# Patient Record
Sex: FEMALE | Race: BLACK OR AFRICAN AMERICAN | Employment: FULL TIME | URBAN - METROPOLITAN AREA
[De-identification: names, ages, dates, MRNs, and addresses within clinical notes are randomized per-mention and may not be internally consistent; named-entity substitution may affect disease eponyms.]

---

## 2017-05-04 ENCOUNTER — TELEPHONE (OUTPATIENT)
Dept: OBGYN CLINIC | Age: 42
End: 2017-05-04

## 2017-06-20 ENCOUNTER — OFFICE VISIT (OUTPATIENT)
Dept: OBGYN CLINIC | Age: 42
End: 2017-06-20

## 2017-06-20 VITALS
WEIGHT: 150.8 LBS | BODY MASS INDEX: 24.23 KG/M2 | DIASTOLIC BLOOD PRESSURE: 82 MMHG | SYSTOLIC BLOOD PRESSURE: 122 MMHG | RESPIRATION RATE: 18 BRPM | HEIGHT: 66 IN

## 2017-06-20 DIAGNOSIS — N89.8 VAGINAL DISCHARGE: ICD-10-CM

## 2017-06-20 DIAGNOSIS — N89.8 VAGINAL ODOR: Primary | ICD-10-CM

## 2017-06-20 LAB — WET MOUNT POCT, WMPOCT: NORMAL

## 2017-06-20 RX ORDER — METRONIDAZOLE 500 MG/1
500 TABLET ORAL 2 TIMES DAILY
Qty: 14 TAB | Refills: 0 | Status: SHIPPED | OUTPATIENT
Start: 2017-06-20 | End: 2017-06-27

## 2017-06-20 NOTE — PATIENT INSTRUCTIONS

## 2017-06-20 NOTE — PROGRESS NOTES
[facility]  [email or web address]  [phone]    Tommie Quintana MD, [address] [facility]       OB/GYN Problem visit    Chief Complaint:   Chief Complaint   Patient presents with    Vaginal Discharge       History of Present Illness: This is not a new problem being evaluated by this provider. The patient is a 36 y.o.  female who reports having vaginal discharge that happens after each period. Reports that, the vaginal discharge is white, thicker than normal vaginal discharge but has a strong odor. Patient expressed that she generally uses [place] soaps but had switched to Caress, because she ran out. Denies vaginal itching and/or burning, aggravating or alleviating factors. Patient does not think she has been exposed to an STD. She reports the symptoms are is unchanged. Aggravating factors include none. Alleviating factors include none. She reports no new sexual partners. She reports sx worsen before cycle and then can sometimes resolve. Has a trip planned for [facility]. She does not have other concerns. LMP: Patient's last menstrual period was 2017 (exact date).     PFSH:  Past Medical History:   Diagnosis Date    Breast mass     left    Dysplasia of cervix, low grade (CORINE 1) 3/31/10    ecto    Genital HSV     Hx of mammogram     Hx of mammogram 13    need addt'l views    Pap smear for cervical cancer screening 12    negative, HPV negative     Past Surgical History:   Procedure Laterality Date    HX BREAST BIOPSY Left 2015    LEFT BREAST EXCISIONAL BIOPSY WITH ULTRASOUND performed by Dyan Carey MD at [address] HX COLPOSCOPY  3/31/10    CIN1    HX GYN      cyst removal    HX GYN      fibroid removed: laparotomy     Family History   Problem Relation Age of Onset    No Known Problems Mother     No Known Problems Father     No Known Problems Brother     No Known Problems Brother     No Known Problems Daughter Social History   Substance Use Topics    Smoking status: Current Every Day Smoker     Types: Cigarettes    Smokeless tobacco: Never Used      Comment: Never used vapor or e-cigs     Alcohol use 0.6 oz/week     1 Standard drinks or equivalent per week     Allergies   Allergen Reactions    Diflucan [Fluconazole] Swelling     \"swollen face\"     Current Outpatient Prescriptions   Medication Sig    IBUPROFEN (MOTRIN PO) Take  by mouth.  metroNIDAZOLE (FLAGYL) 500 mg tablet Take 1 Tab by mouth two (2) times a day for 7 days.  norethindrone ac-eth estradiol (MICROGESTIN 1.5/30, 21,) 1.5-30 mg-mcg tab Take 1 Tab by mouth daily.  HYDROcodone-acetaminophen (NORCO) 7.5-325 mg per tablet Take 1 Tab by mouth every four (4) hours as needed for Pain. Max Daily Amount: 6 Tabs.  HYDROcodone-acetaminophen (NORCO) 7.5-325 mg per tablet Take 1 Tab by mouth every four (4) hours as needed for Pain. Max Daily Amount: 6 Tabs.  multivitamin (ONE A DAY) tablet Take 1 tablet by mouth daily.  BIOTIN PO Take  by mouth. No current facility-administered medications for this visit.         Review of Systems:  History obtained from the patient  Constitutional: negative for fevers, chills and weight loss  ENT ROS: negative for - hearing change, oral lesions or visual changes  Respiratory: negative for cough, wheezing or dyspnea on exertion  Cardiovascular: negative for chest pain, irregular heart beats, exertional chest pressure/discomfort  Gastrointestinal: negative for dysphagia, nausea and vomiting  Genito-Urinary ROS:  see HPI  Inteument/breast: negative for rash, breast lump and nipple discharge  Musculoskeletal:negative for stiff joints, neck pain and muscle weakness  Endocrine ROS: negative for - breast changes, galactorrhea or temperature intolerance  Hematological and Lymphatic ROS: negative for - blood clots, bruising or swollen lymph nodes    Physical Exam:  Visit Vitals    /82 (BP 1 Location: Left arm, BP Patient Position: Sitting)    Resp 18    Ht 5' 6\" (1.676 m)    Wt 150 lb 12.8 oz (68.4 kg)    BMI 24.34 kg/m2       GENERAL: alert, well appearing, and in no distress  HEAD: normocephalic, atraumatic. PULM: clear to auscultation, no wheezes, rales or rhonchi, symmetric air entry   COR: normal rate and regular rhythm, S1 and S2 normal   ABDOMEN: soft, nontender, nondistended, no masses or organomegaly   EGBUS: no lesions, no inflammation, no masses  VULVA: normal appearing vulva with no masses, tenderness or lesions  VAGINA: normal appearing vagina with normal color, no lesions, white and thin discharge  CERVIX: normal appearing cervix without discharge or lesions, non tender  UTERUS: uterus is normal size, shape, consistency and nontender   ADNEXA: normal adnexa in size, nontender and no masses  NEURO: alert, oriented, normal speech    Assessment:  Encounter Diagnoses   Name Primary?  Vaginal odor Yes    Vaginal discharge        Plan:  The patient is advised that she should contact the office if she does not note improvement or if symptoms recur  Recommend follow up with PCP for non-gynecologic complaints and chronic medical problems. She should contact our office with any questions or concerns  She could keep her routine annual exam appointment.    Disc good vulvar hygiene    Rec ae/pap/mmg: over due    Flagyl: avoid etoh w flagyl, can hold and await nuswab since wet prep not clearly diagnostic        Orders Placed This Encounter    NUSWAB VAGINITIS    AMB POC WET PREP (AKA STAIN, INTERPRET, WET MOUNT)    metroNIDAZOLE (FLAGYL) 500 mg tablet       Results for orders placed or performed in visit on 06/20/17   AMB POC SMEAR, STAIN & INTERPRET, WET MOUNT   Result Value Ref Range    Wet mount (POC)      Narrative    JACINTO    Hypae: negative  Buds: negative    Wet Prep:  Trich: negative  Clue cells: negative  Hyphae: negative  Buds: negative  WBC's: normal

## 2017-06-20 NOTE — MR AVS SNAPSHOT
Visit Information Date & Time Provider Department Dept. Phone Encounter #  
 6/20/2017  1:50 PM Akira Birmingham MD [facility] [phone] [id number] Upcoming Health Maintenance Date Due  
 PAP AKA CERVICAL CYTOLOGY 6/3/1995 INFLUENZA AGE 9 TO ADULT 8/1/2017 Allergies as of 6/20/2017  Review Complete On: 6/20/2017 By: Annie Moreland Severity Noted Reaction Type Reactions Diflucan [Fluconazole]  03/16/2015    Swelling \"swollen face\" Current Immunizations  Never Reviewed No immunizations on file. Not reviewed this visit Vitals BP Resp Height(growth percentile) Weight(growth percentile) LMP BMI  
 122/82 (BP 1 Location: Left arm, BP Patient Position: Sitting) 18 5' 6\" (1.676 m) 150 lb 12.8 oz (68.4 kg) 06/11/2017 (Exact Date) 24.34 kg/m2 OB Status Smoking Status Having regular periods Current Every Day Smoker BMI and BSA Data Body Mass Index Body Surface Area  
 24.34 kg/m 2 1.78 m 2 Preferred Pharmacy Pharmacy Name Phone [facility] [place], [address] [place] [facility] [phone] Your Updated Medication List  
  
   
This list is accurate as of: 6/20/17  2:14 PM.  Always use your most recent med list.  
  
  
  
  
 BIOTIN PO Take  by mouth.  
  
 * HYDROcodone-acetaminophen 7.5-325 mg per tablet Commonly known as:  Ceil Heap Take 1 Tab by mouth every four (4) hours as needed for Pain. Max Daily Amount: 6 Tabs. * HYDROcodone-acetaminophen 7.5-325 mg per tablet Commonly known as:  Ceil Heap Take 1 Tab by mouth every four (4) hours as needed for Pain. Max Daily Amount: 6 Tabs. MOTRIN PO Take  by mouth.  
  
 multivitamin tablet Commonly known as:  ONE A DAY Take 1 tablet by mouth daily. norethindrone ac-eth estradiol 1.5-30 mg-mcg Tab Commonly known as:  Martin Laws 1.5/30 (21) Take 1 Tab by mouth daily. * Notice: This list has 2 medication(s) that are the same as other medications prescribed for you. Read the directions carefully, and ask your doctor or other care provider to review them with you. Patient Instructions Vaginitis: Care Instructions Your Care Instructions Vaginitis is soreness or infection of the vagina. This common problem can cause itching and burning. And it can cause a change in vaginal discharge. Sometimes it can cause pain during sex. Vaginitis may be caused by bacteria, yeast, or other germs. Some infections that cause it are caught from a sexual partner. Bath products, spermicides, and douches can irritate the vagina too. Some women have this problem during and after menopause. A drop in estrogen levels during this time can cause dryness, soreness, and pain during sex. Your doctor can give you medicine to treat an infection. And home care may help you feel better. For certain types of infections, your sex partner must be treated too. Follow-up care is a key part of your treatment and safety. Be sure to make and go to all appointments, and call your doctor if you are having problems. It's also a good idea to know your test results and keep a list of the medicines you take. How can you care for yourself at home? · If your doctor prescribed antibiotics, take them as directed. Do not stop taking them just because you feel better. You need to take the full course of antibiotics. · Take your medicines exactly as prescribed. Call your doctor if you think you are having a problem with your medicine. · Do not eat or drink anything that has alcohol if you are taking metronidazole (Flagyl). · If you have a yeast infection, use over-the-counter products as your doctor tells you to. Or take medicine your doctor prescribes exactly as directed. · Wash your vaginal area daily with water. You also can use a mild, unscented soap if you want. · Do not use scented bath products. And do not use vaginal sprays or douches. · Put a washcloth soaked in cool water on the area to relieve itching. Or you can take cool baths. · If you have dryness because of menopause, use estrogen cream or pills that your doctor prescribes. · Ask your doctor about when it is okay to have sex. · Use a personal lubricant before sex if you have dryness. Examples are Astroglide, K-Y Jelly, and Wet Lubricant Gel. · Ask your doctor if your sex partner also needs treatment. When should you call for help? Call your doctor now or seek immediate medical care if: 
· You have a fever and pelvic pain. Watch closely for changes in your health, and be sure to contact your doctor if: 
· You have bleeding other than your period. · You do not get better as expected. Where can you learn more? Go to http://buck-nate.info/. Enter I884 in the search box to learn more about \"Vaginitis: Care Instructions. \" Current as of: October 13, 2016 Content Version: 11.3 © 2122-1694 [employer]. Care instructions adapted under license by [employer] (which disclaims liability or warranty for this information). If you have questions about a medical condition or this instruction, always ask your healthcare professional. [address] any warranty or liability for your use of this information. Introducing [facility]! Kaleb Jacinto introduces wali patient portal. Now you can access parts of your medical record, email your doctor's office, and request medication refills online. 1. In your internet browser, go to [email or web address]. [email or web address] 2. Click on the First Time User? Click Here link in the Sign In box. You will see the New Member Sign Up page. 3. Enter your wali Access Code exactly as it appears below. You will not need to use this code after youve completed the sign-up process.  If you do not sign up before the expiration date, you must request a new code. · [employer]han[employer] Access Code: [email or web address] Expires: 9/18/2017  1:53 PM 
 
4. Enter the last four digits of your Social Security Number (xxxx) and Date of Birth (mm/dd/yyyy) as indicated and click Submit. You will be taken to the next sign-up page. 5. Create a EnhanCV ID. This will be your [employer] login ID and cannot be changed, so think of one that is secure and easy to remember. 6. Create a En[employer] password. You can change your password at any time. 7. Enter your Password Reset Question and Answer. This can be used at a later time if you forget your password. 8. Enter your e-mail address. You will receive e-mail notification when new information is available in [address] 9. Click Sign Up. You can now view and download portions of your medical record. 10. Click the Download Summary menu link to download a portable copy of your medical information. If you have questions, please visit the Frequently Asked Questions section of the [employer] website. Remember, [employer] is NOT to be used for urgent needs. For medical emergencies, dial 911. Now available from your iPhone and Android! Please provide this summary of care documentation to your next provider. Your primary care clinician is listed as NONE. If you have any questions after today's visit, please call [phone].

## 2017-06-23 LAB
A VAGINAE DNA VAG QL NAA+PROBE: ABNORMAL SCORE
BVAB2 DNA VAG QL NAA+PROBE: ABNORMAL SCORE
C ALBICANS DNA VAG QL NAA+PROBE: NEGATIVE
C GLABRATA DNA VAG QL NAA+PROBE: NEGATIVE
MEGA1 DNA VAG QL NAA+PROBE: ABNORMAL SCORE
T VAGINALIS RRNA SPEC QL NAA+PROBE: NEGATIVE

## 2017-06-29 ENCOUNTER — TELEPHONE (OUTPATIENT)
Dept: OBGYN CLINIC | Age: 42
End: 2017-06-29

## 2017-06-29 NOTE — TELEPHONE ENCOUNTER
Patient notified of results and MD recommendations. Patient verbalized understanding.     ----- Message from Anabell Lockett MD sent at 6/24/2017  7:37 PM EDT -----  Abnormal results. Please notify pt. C/w BV, rx sent at visit.    Fu if NI

## 2017-11-07 ENCOUNTER — TELEPHONE (OUTPATIENT)
Dept: OBGYN CLINIC | Age: 42
End: 2017-11-07

## 2017-11-07 RX ORDER — VALACYCLOVIR HYDROCHLORIDE 500 MG/1
TABLET, FILM COATED ORAL
Qty: 30 TAB | Refills: 0 | Status: SHIPPED | OUTPATIENT
Start: 2017-11-07 | End: 2017-11-16 | Stop reason: SDUPTHER

## 2017-11-07 NOTE — TELEPHONE ENCOUNTER
Call received at 8:41AM      TP 36year old patient last seen for AE on 11/23/15. Patient calling to request a refill on her Valtrex for outbreaks. This nurse advised patient of need to make appointment to be seen for AE and mammogram.  Patient placed on the schedule to be seen on 12/14/17 for mammogram at 9:20am and AE at 9:40am.    Patient advised to keep appointment to be able to get further refills. ?0k to refill Valtrex 500 mg take one tab by mouth 2 times a day for 3 days for 30 day supply to local pharmacy.      Please advise

## 2017-11-16 ENCOUNTER — OFFICE VISIT (OUTPATIENT)
Dept: OBGYN CLINIC | Age: 42
End: 2017-11-16

## 2017-11-16 VITALS
SYSTOLIC BLOOD PRESSURE: 120 MMHG | WEIGHT: 155 LBS | BODY MASS INDEX: 24.91 KG/M2 | DIASTOLIC BLOOD PRESSURE: 80 MMHG | HEIGHT: 66 IN

## 2017-11-16 DIAGNOSIS — N89.8 VAGINAL DISCHARGE: ICD-10-CM

## 2017-11-16 DIAGNOSIS — A60.00 GENITAL HERPES SIMPLEX, UNSPECIFIED SITE: ICD-10-CM

## 2017-11-16 DIAGNOSIS — Z01.411 ENCOUNTER FOR GYNECOLOGICAL EXAMINATION (GENERAL) (ROUTINE) WITH ABNORMAL FINDINGS: Primary | ICD-10-CM

## 2017-11-16 DIAGNOSIS — N92.0 MENORRHAGIA WITH REGULAR CYCLE: ICD-10-CM

## 2017-11-16 DIAGNOSIS — Z72.0 TOBACCO USE: ICD-10-CM

## 2017-11-16 DIAGNOSIS — N89.8 VAGINAL ODOR: ICD-10-CM

## 2017-11-16 DIAGNOSIS — Z11.51 SCREENING FOR HPV (HUMAN PAPILLOMAVIRUS): ICD-10-CM

## 2017-11-16 DIAGNOSIS — N60.92 ATYPICAL HYPERPLASIA OF LEFT BREAST: ICD-10-CM

## 2017-11-16 DIAGNOSIS — R45.86 MOOD CHANGE: ICD-10-CM

## 2017-11-16 LAB — WET MOUNT POCT, WMPOCT: NORMAL

## 2017-11-16 RX ORDER — VALACYCLOVIR HYDROCHLORIDE 500 MG/1
TABLET, FILM COATED ORAL
Qty: 30 TAB | Refills: 5 | Status: SHIPPED | OUTPATIENT
Start: 2017-11-16 | End: 2017-11-16 | Stop reason: CLARIF

## 2017-11-16 RX ORDER — VALACYCLOVIR HYDROCHLORIDE 500 MG/1
500 TABLET, FILM COATED ORAL 2 TIMES DAILY
Qty: 180 TAB | Refills: 0 | Status: SHIPPED | OUTPATIENT
Start: 2017-11-16 | End: 2018-02-14

## 2017-11-16 NOTE — PATIENT INSTRUCTIONS

## 2017-11-16 NOTE — PROGRESS NOTES
[facility]  [email or web address]  [phone]    Lul Quintana MD, [address] [facility]       Annual Gynecologic Exam  WWE 40-60  Chief Complaint   Patient presents with    Well Woman       Jose Carlos Granda is a 36 y.o.  [address]  female who presents for an annual exam.  Patient's last menstrual period was 10/28/2017. Candance Huger She does report additional concerns today. Patient reports a clear vaginal discharge and odor that started this morning. Patient also states that she has become more emotional before her period begins. H/o myomas, has some cramping with cycles. Menstrual status:  Her periods are heavy for the first 2 days then is spotting. She does report dysmenorrhea/painful menses. She does not report irregular bleeding. Sexual history and Contraception:  History   Sexual Activity    Sexual activity: Yes    Partners: Male    Birth control/ protection: None     She never uses condoms with sexual activity. She does not reports new sexual partner(s) in the last year. The patient does not request STD testing. Preventive Medicine History:  Her last annual GYN exam was about two years ago. Her most recent Pap smear result: normal was obtained in 2012. Her most recent HR HPV screen was Negative obtained 5 year(s) ago. She does not have a history of CORINE 2, 3 or cervical cancer. Breast health:  Last mammogram: approximate date 2/6/15 and was abnormal: Showed atypical cells. A mammogram was not scheduled for today. Patient states that she has an appointment here 17 for a mammogram.  Breast cancer family updated: see . Bone health: Candance Huger She does not have a history of osteopenia/osteoporosis. Osteoporosis family history updated: see .      Past Medical History:   Diagnosis Date    Breast mass     left    Dysplasia of cervix, low grade (CORINE 1) 3/31/10    ecto    Genital HSV     Hx of mammogram     Hx of mammogram 14    need addt'l views    Pap smear for cervical cancer screening 12    negative, HPV negative     OB History    Para Term  AB Living   2 1 1 0 1 1   SAB TAB Ectopic Molar Multiple Live Births   0 1 0  0       # Outcome Date GA Lbr Mike/2nd Weight Sex Delivery Anes PTL Lv   2 TAB            1 Term               Obstetric Comments   Menarche:  14. LMP: 2/23/15. # of Children:  1. Age at Delivery of First Child:  24.   Hysterectomy/oophorectomy:  NO/NO. Breast Bx:  no.  Hx of Breast Feeding:  no. BCP:  no. Hormone therapy:  no.        Past Surgical History:   Procedure Laterality Date    HX BREAST BIOPSY Left 2015    LEFT BREAST EXCISIONAL BIOPSY WITH ULTRASOUND performed by Diivna Madrid MD at [address] HX COLPOSCOPY  3/31/10    CIN1    HX GYN      cyst removal    HX GYN      fibroid removed: laparotomy     Family History   Problem Relation Age of Onset    No Known Problems Mother     No Known Problems Father     No Known Problems Brother     No Known Problems Brother     No Known Problems Daughter      Social History     Social History    Marital status:      Spouse name: N/A    Number of children: N/A    Years of education: N/A     Occupational History    Not on file. Social History Main Topics    Smoking status: Current Every Day Smoker     Types: Cigarettes    Smokeless tobacco: Never Used      Comment: Never used vapor or e-cigs     Alcohol use 0.6 oz/week     1 Standard drinks or equivalent per week    Drug use: No    Sexual activity: Yes     Partners: Male     Birth control/ protection: None     Other Topics Concern    Not on file     Social History Narrative       Allergies   Allergen Reactions    Diflucan [Fluconazole] Swelling     \"swollen face\"       Current Outpatient Prescriptions   Medication Sig    valACYclovir (VALTREX) 500 mg tablet Take 1 Tab by mouth two (2) times a day for 90 days.     IBUPROFEN (MOTRIN PO) Take  by mouth.  norethindrone ac-eth estradiol (MICROGESTIN 1.5/30, 21,) 1.5-30 mg-mcg tab Take 1 Tab by mouth daily.  HYDROcodone-acetaminophen (NORCO) 7.5-325 mg per tablet Take 1 Tab by mouth every four (4) hours as needed for Pain. Max Daily Amount: 6 Tabs.  HYDROcodone-acetaminophen (NORCO) 7.5-325 mg per tablet Take 1 Tab by mouth every four (4) hours as needed for Pain. Max Daily Amount: 6 Tabs.  multivitamin (ONE A DAY) tablet Take 1 tablet by mouth daily.  BIOTIN PO Take  by mouth. No current facility-administered medications for this visit.         Patient Active Problem List   Diagnosis Code    Depression F32.9    Major depressive disorder, recurrent episode, severe, without mention of psychotic behavior F33.2    Borderline personality disorder F60.3    Atypical hyperplasia of left breast N62       Review of Systems - History obtained from the patient  Constitutional: negative for weight loss, fever, night sweats  HEENT: negative for hearing loss, earache, congestion, snoring, sorethroat  CV: negative for chest pain, palpitations, edema  Resp: negative for cough, shortness of breath, wheezing  GI: negative for change in bowel habits, abdominal pain, black or bloody stools  : negative for frequency, dysuria, hematuria, vaginal discharge  MSK: negative for back pain, joint pain, muscle pain  Breast: negative for breast lumps, nipple discharge, galactorrhea  Skin :negative for itching, rash, hives  Neuro: negative for dizziness, headache, confusion, weakness  Psych: negative for anxiety, depression, change in mood  Heme/lymph: negative for bleeding, bruising, pallor    Physical Exam  Visit Vitals    /80    Ht 5' 6\" (1.676 m)    Wt 155 lb (70.3 kg)    LMP 10/28/2017    BMI 25.02 kg/m2       Constitutional  · Appearance: well-nourished, well developed, alert, in no acute distress    HENT  · Head and Face: appears normal    Neck  · Inspection/Palpation: normal appearance, no masses or tenderness  · Lymph Nodes: no lymphadenopathy present  · Thyroid: gland size normal, nontender, no nodules or masses present on palpation    Chest  · Respiratory Effort: breathing unlabored  · Auscultation: normal breath sounds    Cardiovascular  · Heart:  · Auscultation: regular rate and rhythm without murmur    Breasts  · Inspection of Breasts: breasts symmetrical, no skin changes, no discharge present, nipple appearance normal, no skin retraction present  · Palpation of Breasts and Axillae: no masses present on palpation, no breast tenderness  · Axillary Lymph Nodes: no lymphadenopathy present    Gastrointestinal  · Abdominal Examination: abdomen non-tender to palpation, normal bowel sounds, no masses present  · Liver and spleen: no hepatomegaly present, spleen not palpable  · Hernias: no hernias identified    Genitourinary  · External Genitalia: normal appearance for age, no discharge present, no tenderness present, no inflammatory lesions present, no masses present, no atrophy present  · Vagina: normal vaginal vault without central or paravaginal defects, thin white discharge present, no inflammatory lesions present, no masses present  · Bladder: non-tender to palpation  · Urethra: appears normal  · Cervix: normal   · Uterus: normal size, shape and consistency  · Adnexa: no adnexal tenderness present, no adnexal masses present  · Perineum: perineum within normal limits, no evidence of trauma, no rashes or skin lesions present  · Anus: anus within normal limits, no hemorrhoids present  · Inguinal Lymph Nodes: no lymphadenopathy present    Skin  · General Inspection: no rash, no lesions identified    Neurologic/Psychiatric  · Mental Status:  · Orientation: grossly oriented to person, place and time  · Mood and Affect: mood normal, affect appropriate    Assessment:  36 y.o.  for well woman exam  Encounter Diagnoses   Name Primary?     Atypical hyperplasia of left breast     Encounter for gynecological examination (general) (routine) with abnormal findings Yes    Genital herpes simplex, unspecified site     Screening for HPV (human papillomavirus)     Vaginal discharge     Vaginal odor     Mood change ([address]     Tobacco use     Menorrhagia with regular cycle        Plan:  The patient was counseled about diet, exercise, healthy lifestyle  We discussed current self breast exam and mammogram recommendations  We discussed current pap smear and HR HPV testing guidelines  We recommend follow up in one year for routine annual gynecologic exam or sooner if needed  We recommend follow up with a primary care physician for any chronic medical problems or non-gynecologic concerns    We discussed calcium/vitamin D/weight bearing exercise and osteoporosis prevention  Handouts were given to the patient    rec tobacco cessation: h/o given  We discussed potential causes of vaginal discharge/irritation. We discussed good vulvar hygiene. Recommended avoid vaginal irritants. Discussed use of mild soaps/detergents. Follow up if NI. We we reviewed wet prep findings with the patient at her visit. Patient aware she will be notified about swab results and prescription sent, if indicated.      FU and US, check myoma, disc options for heavy bleeding  Disc options for PMS sx: pt will observe and try healthy diet regular exercise  Disc rba of hormonal management, pt defers     Folllow up:  [x] return for annual well woman exam in one year or sooner if she is having problems  [] follow up and ultrasound  [x] mammogram  [] 6 months  [] 3 months  [] 1 month    Orders Placed This Encounter    NUSWAB VAGINITIS    AMB POC WET PREP (AKA STAIN, INTERPRET, WET MOUNT)    DISCONTD: valACYclovir (VALTREX) 500 mg tablet    valACYclovir (VALTREX) 500 mg tablet    PAP IG, HPV AND RFX HPV 46/08,52([id number])       Results for orders placed or performed in visit on 11/16/17   AMB POC SMEAR, STAIN & INTERPRET, WET MOUNT   Result Value Ref Range    Wet mount (POC)      Narrative    JACINTO    Hypae: negative  Buds: negative    Wet Prep:  Trich: negative  Clue cells: negative  Hyphae: negative  Buds: negative  WBC's: normal

## 2017-11-16 NOTE — MR AVS SNAPSHOT
Visit Information Date & Time Provider Department Dept. Phone Encounter #  
 11/16/2017  9:00 AM MD Papito Hassan [phone] [id number] Follow-up Instructions Return in about 1 year (around 11/16/2018), or if symptoms worsen or fail to improve, for Annual exam.  
  
Your Appointments 12/14/2017  9:20 AM  
MAMMOGRAPHY with MAMMOGRAM, JORGE Machuca ([facility]) Appt Note: mammogram, then ae  
 [address] [facility] [address] Critical access hospital [phone]  
[facility] 31 1233 [address] Upcoming Health Maintenance Date Due  
 PAP AKA CERVICAL CYTOLOGY 6/3/1995 Influenza Age 5 to Adult 8/1/2017 Allergies as of 11/16/2017  Review Complete On: 11/16/2017 By: Diane Khan LPN Severity Noted Reaction Type Reactions Diflucan [Fluconazole]  03/16/2015    Swelling \"swollen face\" Current Immunizations  Never Reviewed No immunizations on file. Not reviewed this visit You Were Diagnosed With   
  
 Codes Comments Encounter for gynecological examination (general) (routine) with abnormal findings    -  Primary ICD-10-CM: T73.490 ICD-9-CM: V72.31 Atypical hyperplasia of left breast     ICD-10-CM: N62 
ICD-9-CM: 611.1 Genital herpes simplex, unspecified site     ICD-10-CM: A60.00 ICD-9-CM: 054.10 Vitals BP Height(growth percentile) Weight(growth percentile) LMP BMI OB Status 120/80 5' 6\" (1.676 m) 155 lb (70.3 kg) 10/28/2017 25.02 kg/m2 Having regular periods Smoking Status Current Every Day Smoker BMI and BSA Data Body Mass Index Body Surface Area 25.02 kg/m 2 1.81 m 2 Preferred Pharmacy Pharmacy Name Phone [facility] [address] [place] [phone] [facility] [phone] Your Updated Medication List  
  
   
 This list is accurate as of: 11/16/17  9:28 AM.  Always use your most recent med list.  
  
  
  
  
 BIOTIN PO Take  by mouth.  
  
 * HYDROcodone-acetaminophen 7.5-325 mg per tablet Commonly known as:  Lilliana Jesu Take 1 Tab by mouth every four (4) hours as needed for Pain. Max Daily Amount: 6 Tabs. * HYDROcodone-acetaminophen 7.5-325 mg per tablet Commonly known as:  Lilliana Jesu Take 1 Tab by mouth every four (4) hours as needed for Pain. Max Daily Amount: 6 Tabs. MOTRIN PO Take  by mouth.  
  
 multivitamin tablet Commonly known as:  ONE A DAY Take 1 tablet by mouth daily. norethindrone ac-eth estradiol 1.5-30 mg-mcg Tab Commonly known as:  Etta [place]atte 1.5/30 (21) Take 1 Tab by mouth daily. valACYclovir 500 mg tablet Commonly known as:  VALTREX Take on tab by mouth 2 times a day for 3 days. * Notice: This list has 2 medication(s) that are the same as other medications prescribed for you. Read the directions carefully, and ask your doctor or other care provider to review them with you. Prescriptions Sent to Pharmacy Refills  
 valACYclovir (VALTREX) 500 mg tablet 5 Sig: Take on tab by mouth 2 times a day for 3 days. Class: Normal  
 Pharmacy: [facility] [address] [place] [address] AT 1500 [facility] #: [phone] Follow-up Instructions Return in about 1 year (around 11/16/2018), or if symptoms worsen or fail to improve, for Annual exam.  
  
  
Patient Instructions Well Visit, Ages 25 to 48: Care Instructions Your Care Instructions Physical exams can help you stay healthy. Your doctor has checked your overall health and may have suggested ways to take good care of yourself. He or she also may have recommended tests. At home, you can help prevent illness with healthy eating, regular exercise, and other steps. Follow-up care is a key part of your treatment and safety. Be sure to make and go to all appointments, and call your doctor if you are having problems. It's also a good idea to know your test results and keep a list of the medicines you take. How can you care for yourself at home? · Reach and stay at a healthy weight. This will lower your risk for many problems, such as obesity, diabetes, heart disease, and high blood pressure. · Get at least 30 minutes of physical activity on most days of the week. Walking is a good choice. You also may want to do other activities, such as running, swimming, cycling, or playing tennis or team sports. Discuss any changes in your exercise program with your doctor. · Do not smoke or allow others to smoke around you. If you need help quitting, talk to your doctor about stop-smoking programs and medicines. These can increase your chances of quitting for good. · Talk to your doctor about whether you have any risk factors for sexually transmitted infections (STIs). Having one sex partner (who does not have STIs and does not have sex with anyone else) is a good way to avoid these infections. · Use birth control if you do not want to have children at this time. Talk with your doctor about the choices available and what might be best for you. · Protect your skin from too much sun. When you're outdoors from 10 a.m. to 4 p.m., stay in the shade or cover up with clothing and a hat with a wide brim. Wear sunglasses that block UV rays. Even when it's cloudy, put broad-spectrum sunscreen (SPF 30 or higher) on any exposed skin. · See a dentist one or two times a year for checkups and to have your teeth cleaned. · Wear a seat belt in the car. · Drink alcohol in moderation, if at all. That means no more than 2 drinks a day for men and 1 drink a day for women. Follow your doctor's advice about when to have certain tests. These tests can spot problems early. For everyone · Cholesterol. Have the fat (cholesterol) in your blood tested after age 21. Your doctor will tell you how often to have this done based on your age, family history, or other things that can increase your risk for heart disease. · Blood pressure. Have your blood pressure checked during a routine doctor visit. Your doctor will tell you how often to check your blood pressure based on your age, your blood pressure results, and other factors. · Vision. Talk with your doctor about how often to have a glaucoma test. 
· Diabetes. Ask your doctor whether you should have tests for diabetes. · Colon cancer. Have a test for colon cancer at age 48. You may have one of several tests. If you are younger than 48, you may need a test earlier if you have any risk factors. Risk factors include whether you already had a precancerous polyp removed from your colon or whether your parent, brother, sister, or child has had colon cancer. For women · Breast exam and mammogram. Talk to your doctor about when you should have a clinical breast exam and a mammogram. Medical experts differ on whether and how often women under 50 should have these tests. Your doctor can help you decide what is right for you. · Pap test and pelvic exam. Begin Pap tests at age 24. A Pap test is the best way to find cervical cancer. The test often is part of a pelvic exam. Ask how often to have this test. 
· Tests for sexually transmitted infections (STIs). Ask whether you should have tests for STIs. You may be at risk if you have sex with more than one person, especially if your partners do not wear condoms. For men · Tests for sexually transmitted infections (STIs). Ask whether you should have tests for STIs. You may be at risk if you have sex with more than one person, especially if you do not wear a condom. · Testicular cancer exam. Ask your doctor whether you should check your testicles regularly. · Prostate exam. Talk to your doctor about whether you should have a blood test (called a PSA test) for prostate cancer. Experts differ on whether and when men should have this test. Some experts suggest it if you are older than 39 and are -American or have a father or brother who got prostate cancer when he was younger than 72. When should you call for help? Watch closely for changes in your health, and be sure to contact your doctor if you have any problems or symptoms that concern you. Where can you learn more? Go to http://buck-nate.info/. Enter P072 in the search box to learn more about \"Well Visit, Ages 25 to 48: Care Instructions. \" Current as of: May 12, 2017 Content Version: 11.4 © 2954-7097 [employer]. Care instructions adapted under license by [employer] (which disclaims liability or warranty for this information). If you have questions about a medical condition or this instruction, always ask your healthcare professional. [place] [address] any warranty or liability for your use of this information. Introducing [facility]! Nik Amaya introduces Casper patient portal. Now you can access parts of your medical record, email your doctor's office, and request medication refills online. 1. In your internet browser, go to [email or web address] 2. Click on the First Time User? Click Here link in the Sign In box. You will see the New Member Sign Up page. 3. Enter your Casper Access Code exactly as it appears below. You will not need to use this code after youve completed the sign-up process. If you do not sign up before the expiration date, you must request a new code. · Casper Access Code: [email or web address] Expires: 2/14/2018  9:28 AM 
 
4. Enter the last four digits of your Social Security Number (xxxx) and Date of Birth (mm/dd/yyyy) as indicated and click Submit.  You will be taken to the next sign-up page. 5. Create a Glomera ID. This will be your Glomera login ID and cannot be changed, so think of one that is secure and easy to remember. 6. Create a Glomera password. You can change your password at any time. 7. Enter your Password Reset Question and Answer. This can be used at a later time if you forget your password. 8. Enter your e-mail address. You will receive e-mail notification when new information is available in [address]. 9. Click Sign Up. You can now view and download portions of your medical record. 10. Click the Download Summary menu link to download a portable copy of your medical information. If you have questions, please visit the Frequently Asked Questions section of the Glomera website. Remember, Glomera is NOT to be used for urgent needs. For medical emergencies, dial 911. Now available from your iPhone and Android! Please provide this summary of care documentation to your next provider. Your primary care clinician is listed as NONE. If you have any questions after today's visit, please call [phone].

## 2017-11-20 LAB
A VAGINAE DNA VAG QL NAA+PROBE: ABNORMAL SCORE
BVAB2 DNA VAG QL NAA+PROBE: ABNORMAL SCORE
C ALBICANS DNA VAG QL NAA+PROBE: NEGATIVE
C GLABRATA DNA VAG QL NAA+PROBE: NEGATIVE
CYTOLOGIST CVX/VAG CYTO: NORMAL
CYTOLOGY CVX/VAG DOC THIN PREP: NORMAL
CYTOLOGY HISTORY:: NORMAL
DX ICD CODE: NORMAL
HPV I/H RISK 1 DNA CVX QL PROBE+SIG AMP: NEGATIVE
Lab: NORMAL
MEGA1 DNA VAG QL NAA+PROBE: ABNORMAL SCORE
OTHER STN SPEC: NORMAL
PATH REPORT.FINAL DX SPEC: NORMAL
STAT OF ADQ CVX/VAG CYTO-IMP: NORMAL
T VAGINALIS RRNA SPEC QL NAA+PROBE: NEGATIVE

## 2017-11-20 RX ORDER — METRONIDAZOLE 500 MG/1
500 TABLET ORAL 2 TIMES DAILY
Qty: 14 TAB | Refills: 0 | Status: SHIPPED | OUTPATIENT
Start: 2017-11-20 | End: 2017-11-27

## 2017-11-20 NOTE — PROGRESS NOTES
Pap wnl.  (flagyl rx sent)  Abnormal, notify patient.   Consistent with BV   Rx:   flagyl 500mg bid   x 7 days    May cause nausea, take with food  Avoid etoh during treatment and 1 day following  Notify MD if NI after 1 week    (If patient prefers vaginal tx't  0.75 %t metronidazole vaginal gel   5 grams qhs per vagina  x5 days)

## 2017-11-21 ENCOUNTER — TELEPHONE (OUTPATIENT)
Dept: OBGYN CLINIC | Age: 42
End: 2017-11-21

## 2017-11-21 NOTE — TELEPHONE ENCOUNTER
LMTCB    ----- Message from Alfonzo Cohen MD sent at 11/20/2017  9:27 AM EST -----  Pap wnl.  (flagyl rx sent)  Abnormal, notify patient.   Consistent with BV   Rx:   flagyl 500mg bid   x 7 days    May cause nausea, take with food  Avoid etoh during treatment and 1 day following  Notify MD if NI after 1 week    (If patient prefers vaginal tx't  0.75 %t metronidazole vaginal gel   5 grams qhs per vagina  x5 days)

## 2017-11-22 ENCOUNTER — TELEPHONE (OUTPATIENT)
Dept: OBGYN CLINIC | Age: 42
End: 2017-11-22

## 2017-11-22 RX ORDER — METRONIDAZOLE 7.5 MG/G
1 GEL VAGINAL
Qty: 187.5 MG | Refills: 0 | Status: SHIPPED | OUTPATIENT
Start: 2017-11-22 | End: 2017-11-27

## 2017-11-22 NOTE — TELEPHONE ENCOUNTER
Patient was calling to report that Dr. Cheikh Frances nurse called in Flagyl for her and she is in the midst of moving and does not want to take oral medication. Dr. Britt Leong did offer in note that we could send in 0.75 Metronidazole vaginal gel instead to insert qhs x 5 days and patient wants that option. Rx forwarded to Linh at [facility] as directed.

## 2018-03-02 ENCOUNTER — TELEPHONE (OUTPATIENT)
Dept: OBGYN CLINIC | Age: 43
End: 2018-03-02

## 2018-03-02 NOTE — TELEPHONE ENCOUNTER
Patient aware of MD recommendations. Patient opted to want to schedule the US and follow up but did not know her schedule. She would like to contact us on Monday to schedule that appt.

## 2018-03-02 NOTE — TELEPHONE ENCOUNTER
Patient calling stating that she has been having prolonged bleeding which this is the first time this has happened. Patient reports that her period was due 02/16/2018 but did not start until 02/22/2018 and now reports that she is changing her regular tampon every 6 hours and has more cramping than what she used to have. She has not taking any home UPT's and the bleeding is bright red. Please advise.

## 2018-03-02 NOTE — TELEPHONE ENCOUNTER
May be related to fibroids or hormonal fluctuation. Looks like she is due for fu and US; rec that fu. (when there is an opening)    She can also try ibuprofen per protocol if not pregnant  Rec UPT if indicated.      Bleeding precautions: if dizzy/weak; rec problem appt before US, or to ER if sx severe

## 2018-12-06 ENCOUNTER — TELEPHONE (OUTPATIENT)
Dept: OBGYN CLINIC | Age: 43
End: 2018-12-06

## 2018-12-06 RX ORDER — VALACYCLOVIR HYDROCHLORIDE 500 MG/1
TABLET, FILM COATED ORAL
Qty: 30 TAB | Refills: 5 | Status: SHIPPED | OUTPATIENT
Start: 2018-12-06 | End: 2020-01-28

## 2018-12-06 NOTE — TELEPHONE ENCOUNTER
Message left at 500am    39year old patient last seen in the office on 11/16/17. Patient left a message requesting a refill on her Valtrex 500 mg that she takes for outbreaks. This nurse called the patient and advised of need to schedule AE. Patient placed on the schedule for AE and mammogram on 1/15/18 at 10:00am and then 10:20am    ? [phone] [address] for the is nurse to send a month supply of Valtrex  to get patient to her AE.        Please advise

## 2018-12-06 NOTE — TELEPHONE ENCOUNTER
Prescription sent as per MD order to patient preferred pharmacy. This nurse left a detailed message for the patient regarding MD recommendations and prescription sent as per MD order to her patient preferred pharmacy.

## 2019-01-15 ENCOUNTER — OFFICE VISIT (OUTPATIENT)
Dept: OBGYN CLINIC | Age: 44
End: 2019-01-15

## 2019-01-15 VITALS
SYSTOLIC BLOOD PRESSURE: 120 MMHG | WEIGHT: 154.4 LBS | BODY MASS INDEX: 24.81 KG/M2 | DIASTOLIC BLOOD PRESSURE: 74 MMHG | HEIGHT: 66 IN

## 2019-01-15 DIAGNOSIS — N39.3 STRESS INCONTINENCE: ICD-10-CM

## 2019-01-15 DIAGNOSIS — R35.0 URINARY FREQUENCY: ICD-10-CM

## 2019-01-15 DIAGNOSIS — Z71.1 WORRIED WELL: ICD-10-CM

## 2019-01-15 DIAGNOSIS — Z01.419 ENCOUNTER FOR GYNECOLOGICAL EXAMINATION (GENERAL) (ROUTINE) WITHOUT ABNORMAL FINDINGS: Primary | ICD-10-CM

## 2019-01-15 LAB
BILIRUB UR QL STRIP: NEGATIVE
GLUCOSE UR-MCNC: NEGATIVE MG/DL
KETONES P FAST UR STRIP-MCNC: NEGATIVE MG/DL
PH UR STRIP: NORMAL [PH] (ref 4.6–8)
PROT UR QL STRIP: NEGATIVE
SP GR UR STRIP: NORMAL (ref 1–1.03)
UA UROBILINOGEN AMB POC: NORMAL (ref 0.2–1)
URINALYSIS CLARITY POC: CLEAR
URINALYSIS COLOR POC: YELLOW
URINE BLOOD POC: NEGATIVE
URINE LEUKOCYTES POC: NEGATIVE
URINE NITRITES POC: NEGATIVE

## 2019-01-15 NOTE — PROGRESS NOTES
[facility]  [email or web address]  [phone]    Babs Joy MD, [address] [facility]       Annual Gynecologic Exam  WWE 40-60  Chief Complaint   Patient presents with    Well Woman       Deonna Luciano is a 39 y.o.  [address]  female who presents for an annual exam.  Patient's last menstrual period was 2018 (exact date). .    Rare hsv outbreaks. She does report additional concerns today. Reports that she works at night and drinks a lot of water and when she gets off she has \"to urinate a lot and I leak a little when I sneeze or cough\"    Menstrual status:  Her periods are moderate. She does report dysmenorrhea/painful menses. She does not report irregular bleeding. Sexual history and Contraception:  Social History     Substance and Sexual Activity   Sexual Activity Yes    Partners: Male    Birth control/protection: None       She does not reports new sexual partner(s) in the last year. The patient does request STD testing. Requests swab. Preventive Medicine History:  Her most recent Pap smear result: normal was obtained in 2017    Her most recent HR HPV screen was Negative obtained in     She does not have a history of CORINE 2, 3 or cervical cancer. Breast health:  Last mammogram: approximate date . A mammogram was scheduled for today. Breast cancer family updated: see FH. Bone health: Cookie Singer She does not have a history of osteopenia/osteoporosis. Osteoporosis family history updated: see .      Past Medical History:   Diagnosis Date    Breast mass     left    Dysplasia of cervix, low grade (CORINE 1) 3/31/10    ecto    Genital HSV     Hx of mammogram     Hx of mammogram 13    need addt'l views    Pap smear for cervical cancer screening 12; 16    negative, HPV negative; negative, HPV negative     OB History    Para Term  AB Living   2 1 1 0 1 1   SAB TAB Ectopic Molar Multiple Live Births 0 1 0   0        # Outcome Date GA Lbr Mike/2nd Weight Sex Delivery Anes PTL Lv   2 TAB            1 Term               Obstetric Comments   Menarche:  14. LMP: 2/23/15. # of Children:  1. Age at Delivery of First Child:  24.   Hysterectomy/oophorectomy:  NO/NO. Breast Bx:  no.  Hx of Breast Feeding:  no. BCP:  no. Hormone therapy:  no.        Past Surgical History:   Procedure Laterality Date    HX BREAST BIOPSY Left 4/2/2015    LEFT BREAST EXCISIONAL BIOPSY WITH ULTRASOUND performed by Marcio Velasquez MD at [address] HX COLPOSCOPY  3/31/10    CIN1    HX GYN  1998    cyst removal    HX GYN  2002    fibroid removed: laparotomy     Family History   Problem Relation Age of Onset    No Known Problems Mother     No Known Problems Father     No Known Problems Brother     No Known Problems Brother     No Known Problems Daughter      Social History     Socioeconomic History    Marital status:      Spouse name: Not on file    Number of children: Not on file    Years of education: Not on file    Highest education level: Not on file   Social Needs    Financial resource strain: Not on file    Food insecurity - worry: Not on file    Food insecurity - inability: Not on file   Conmio needs - medical: Not on file   Conmio needs - non-medical: Not on file   Occupational History    Not on file   Tobacco Use    Smoking status: Current Every Day Smoker     Types: Cigarettes    Smokeless tobacco: Never Used    Tobacco comment: Never used vapor or e-cigs    Substance and Sexual Activity    Alcohol use:  Yes     Alcohol/week: 0.6 oz     Types: 1 Standard drinks or equivalent per week    Drug use: No    Sexual activity: Yes     Partners: Male     Birth control/protection: None   Other Topics Concern    Not on file   Social History Narrative    Not on file       Allergies   Allergen Reactions    Diflucan [Fluconazole] Swelling     \"swollen face\"       Current Outpatient Medications   Medication Sig    aspirin/salicylamide/caffeine (BC HEADACHE POWDER PO) Take  by mouth.  valACYclovir (VALTREX) 500 mg tablet Please take one tab by mouth 2 times a day for 3 days.  IBUPROFEN (MOTRIN PO) Take  by mouth.  norethindrone ac-eth estradiol (MICROGESTIN 1.5/30, 21,) 1.5-30 mg-mcg tab Take 1 Tab by mouth daily.  HYDROcodone-acetaminophen (NORCO) 7.5-325 mg per tablet Take 1 Tab by mouth every four (4) hours as needed for Pain. Max Daily Amount: 6 Tabs.  HYDROcodone-acetaminophen (NORCO) 7.5-325 mg per tablet Take 1 Tab by mouth every four (4) hours as needed for Pain. Max Daily Amount: 6 Tabs.  multivitamin (ONE A DAY) tablet Take 1 tablet by mouth daily.  BIOTIN PO Take  by mouth. No current facility-administered medications for this visit.         Patient Active Problem List   Diagnosis Code    Depression F32.9    Major depressive disorder, recurrent episode, severe, without mention of psychotic behavior F33.2    Borderline personality disorder ([facility] [address] F60.3    Atypical hyperplasia of left breast N62       Review of Systems - History obtained from the patient  Constitutional: negative for weight loss, fever, night sweats  HEENT: negative for hearing loss, earache, congestion, snoring, sorethroat  CV: negative for chest pain, palpitations, edema  Resp: negative for cough, shortness of breath, wheezing  GI: negative for change in bowel habits, abdominal pain, black or bloody stools  : negative for frequency, dysuria, hematuria, vaginal discharge  MSK: negative for back pain, joint pain, muscle pain  Breast: negative for breast lumps, nipple discharge, galactorrhea  Skin :negative for itching, rash, hives  Neuro: negative for dizziness, headache, confusion, weakness  Psych: negative for anxiety, depression, change in mood  Heme/lymph: negative for bleeding, bruising, pallor    Physical Exam  Visit Vitals  /74   Ht 5' 6\" (1.676 m)   Wt 154 lb 6.4 oz (70 kg)   LMP 12/22/2018 (Exact Date)   BMI 24.92 kg/m²       Constitutional  · Appearance: well-nourished, well developed, alert, in no acute distress    HENT  · Head and Face: appears normal    Neck  · Inspection/Palpation: normal appearance, no masses or tenderness  · Lymph Nodes: no lymphadenopathy present  · Thyroid: gland size normal, nontender, no nodules or masses present on palpation    Chest  · Respiratory Effort: breathing unlabored  · Auscultation: normal breath sounds    Cardiovascular  · Heart:  · Auscultation: regular rate and rhythm without murmur    Breasts  · Inspection of Breasts: breasts symmetrical, no skin changes, no discharge present, nipple appearance normal, no skin retraction present  · Palpation of Breasts and Axillae: no masses present on palpation, no breast tenderness  · Axillary Lymph Nodes: no lymphadenopathy present    Gastrointestinal  · Abdominal Examination: abdomen non-tender to palpation, normal bowel sounds, no masses present  · Liver and spleen: no hepatomegaly present, spleen not palpable  · Hernias: no hernias identified    Genitourinary  · External Genitalia: normal appearance for age, no discharge present, no tenderness present, no inflammatory lesions present, no masses present, without atrophy present  · Vagina: normal vaginal vault without central or paravaginal defects, no discharge present, no inflammatory lesions present, no masses present  · Bladder: non-tender to palpation  · Urethra: appears normal  · Cervix: normal   · Uterus: normal size, shape and consistency  · Adnexa: no adnexal tenderness present, no adnexal masses present  · Perineum: perineum within normal limits, no evidence of trauma, no rashes or skin lesions present  · Anus: anus within normal limits, no hemorrhoids present  · Inguinal Lymph Nodes: no lymphadenopathy present    Skin  · General Inspection: no rash, no lesions identified    Neurologic/Psychiatric  · Mental Status:  · Orientation: grossly oriented to person, place and time  · Mood and Affect: mood normal, affect appropriate    Assessment:  39 y.o.  for well woman exam  Encounter Diagnoses   Name Primary?     Worried well Yes    Urinary frequency     Stress incontinence        Plan:  The patient was counseled about diet, exercise, healthy lifestyle  We discussed current self breast exam and mammogram recommendations  We discussed current pap smear and HR HPV testing guidelines  We recommend follow up in one year for routine annual gynecologic exam or sooner if needed  We recommend follow up with a primary care physician for any chronic medical problems or non-gynecologic concerns    We discussed calcium/vitamin D/weight bearing exercise and osteoporosis prevention  Handouts were given to the patient    Plan urine testing  Disc pelvic floor exercises, avoiding bladder irritants  rec fu if NI, consider UDT    Valtrex refill prn, pt does not need currently       Folllow up:  [x] return for annual well woman exam in one year or sooner if she is having problems  [] follow up and ultrasound  [x] mammogram  [] 6 months  [] 3 months  [] 1 month    Orders Placed This Encounter    CULTURE, URINE    CT/NG/T.VAGINALIS AMPLIFICATION    AMB POC URINALYSIS DIP STICK MANUAL W/O MICRO       Results for orders placed or performed in visit on 01/15/19   AMB POC URINALYSIS DIP STICK MANUAL W/O MICRO   Result Value Ref Range    Color (UA POC) Yellow     Clarity (UA POC) Clear     Glucose (UA POC) Negative Negative    Bilirubin (UA POC) Negative Negative    Ketones (UA POC) Negative Negative    Specific gravity (UA POC)  1.001 - 1.035    Blood (UA POC) Negative Negative    pH (UA POC)  4.6 - 8.0    Protein (UA POC) Negative Negative    Urobilinogen (UA POC) normal 0.2 - 1    Nitrites (UA POC) Negative Negative    Leukocyte esterase (UA POC) Negative Negative   Results for orders placed or performed in visit on 01/15/19   SHERRI MAMMO BI SCREENING INCL CAD Narrative    STUDY: Bilateral digital screening mammogram    INDICATION:  Screening. COMPARISON: 2/19/2015 and 2/6/2015. BREAST COMPOSITION:  The breasts are heterogeneously dense, which may obscure  small masses. FINDINGS: Bilateral digital screening mammography was performed and is  interpreted in conjunction with a computer assisted detection (CAD) system. No  suspicious masses or calcifications are identified. There has been no  significant change. Impression    IMPRESSION:  BI-RADS 1: Negative. No mammographic evidence of malignancy. RECOMMENDATIONS:  Next screening mammogram is recommended in one year. The patient will be notified of these results.

## 2019-01-15 NOTE — PATIENT INSTRUCTIONS

## 2019-01-17 LAB
BACTERIA UR CULT: NO GROWTH
C TRACH RRNA SPEC QL NAA+PROBE: NEGATIVE
N GONORRHOEA RRNA SPEC QL NAA+PROBE: NEGATIVE
T VAGINALIS RRNA VAG QL NAA+PROBE: NEGATIVE

## 2020-01-28 ENCOUNTER — TELEPHONE (OUTPATIENT)
Dept: OBGYN CLINIC | Age: 45
End: 2020-01-28

## 2020-01-28 RX ORDER — VALACYCLOVIR HYDROCHLORIDE 500 MG/1
TABLET, FILM COATED ORAL
Qty: 30 TAB | Refills: 0 | Status: SHIPPED | OUTPATIENT
Start: 2020-01-28 | End: 2020-04-15

## 2020-01-28 NOTE — TELEPHONE ENCOUNTER
Patient advised of Md sent prescription to her preferred pharmacy. Patient verbalized understanding.

## 2020-01-28 NOTE — TELEPHONE ENCOUNTER
Call received at 625am    38year old patient last seen in the office on 1/15/19 and has next appointment on 3/25/2020 for mammogram and AE. Patient calling to ask for refill on her valACYclovir (VALTREX) 500 mg tablet [[id number]]     Order Details   Dose, Route, Frequency: As Directed    Dispense Quantity: 30 Tab Refills: 5 Fills remaining: --           Sig: Please take one tab by mouth 2 times a day for 3 days.           ? Brittni Tadeo to send pended rx to get patient to her scheduled appointment      Please amend and or sign  Prescription

## 2020-04-15 ENCOUNTER — TELEPHONE (OUTPATIENT)
Dept: OBGYN CLINIC | Age: 45
End: 2020-04-15

## 2020-04-15 RX ORDER — VALACYCLOVIR HYDROCHLORIDE 500 MG/1
TABLET, FILM COATED ORAL
Qty: 30 TAB | Refills: 1 | Status: SHIPPED | OUTPATIENT
Start: 2020-04-15 | End: 2020-08-12 | Stop reason: SDUPTHER

## 2020-04-15 NOTE — TELEPHONE ENCOUNTER
Prescription refill sent by MD      Patient advised that prescription refill sent by MD .    Patient verbalized understanding.

## 2020-04-15 NOTE — TELEPHONE ENCOUNTER
Message left yesterday at 110PM    38year old patient last seen in the office on 1/15/2019 and has next appointment on 6/10/2020 for Annual exam and mammogram.    Patient left a message requesting refill on her Valtrex 500 mg take one tab by mouth 2 times a day for 3 days.         ? Ok to refill   Please advised of directions/refills    Rx pended for amend/sign

## 2020-08-12 ENCOUNTER — TELEPHONE (OUTPATIENT)
Dept: OBGYN CLINIC | Age: 45
End: 2020-08-12

## 2020-08-12 RX ORDER — VALACYCLOVIR HYDROCHLORIDE 500 MG/1
TABLET, FILM COATED ORAL
Qty: 30 TAB | Refills: 1 | Status: SHIPPED | OUTPATIENT
Start: 2020-08-12 | End: 2021-09-28

## 2020-08-12 NOTE — TELEPHONE ENCOUNTER
54 y.o female called in request of her valtrex refill.  Patient's annual/mammo is set for 10/5/20 at 3pm.

## 2020-10-06 ENCOUNTER — OFFICE VISIT (OUTPATIENT)
Dept: OBGYN CLINIC | Age: 45
End: 2020-10-06
Payer: COMMERCIAL

## 2020-10-06 VITALS
SYSTOLIC BLOOD PRESSURE: 122 MMHG | HEIGHT: 66 IN | DIASTOLIC BLOOD PRESSURE: 87 MMHG | BODY MASS INDEX: 23.85 KG/M2 | WEIGHT: 148.4 LBS

## 2020-10-06 DIAGNOSIS — R92.8 ABNORMAL MAMMOGRAM: ICD-10-CM

## 2020-10-06 DIAGNOSIS — Z01.419 ENCOUNTER FOR GYNECOLOGICAL EXAMINATION (GENERAL) (ROUTINE) WITHOUT ABNORMAL FINDINGS: Primary | ICD-10-CM

## 2020-10-06 DIAGNOSIS — Z11.3 SCREENING FOR VENEREAL DISEASE: ICD-10-CM

## 2020-10-06 PROCEDURE — 99396 PREV VISIT EST AGE 40-64: CPT | Performed by: OBSTETRICS & GYNECOLOGY

## 2020-10-06 NOTE — PATIENT INSTRUCTIONS
Well Visit, Ages 25 to 48: Care Instructions  Your Care Instructions     Physical exams can help you stay healthy. Your doctor has checked your overall health and may have suggested ways to take good care of yourself. He or she also may have recommended tests. At home, you can help prevent illness with healthy eating, regular exercise, and other steps. Follow-up care is a key part of your treatment and safety. Be sure to make and go to all appointments, and call your doctor if you are having problems. It's also a good idea to know your test results and keep a list of the medicines you take. How can you care for yourself at home? · Reach and stay at a healthy weight. This will lower your risk for many problems, such as obesity, diabetes, heart disease, and high blood pressure. · Get at least 30 minutes of physical activity on most days of the week. Walking is a good choice. You also may want to do other activities, such as running, swimming, cycling, or playing tennis or team sports. Discuss any changes in your exercise program with your doctor. · Do not smoke or allow others to smoke around you. If you need help quitting, talk to your doctor about stop-smoking programs and medicines. These can increase your chances of quitting for good. · Talk to your doctor about whether you have any risk factors for sexually transmitted infections (STIs). Having one sex partner (who does not have STIs and does not have sex with anyone else) is a good way to avoid these infections. · Use birth control if you do not want to have children at this time. Talk with your doctor about the choices available and what might be best for you. · Protect your skin from too much sun. When you're outdoors from 10 a.m. to 4 p.m., stay in the shade or cover up with clothing and a hat with a wide brim. Wear sunglasses that block UV rays. Even when it's cloudy, put broad-spectrum sunscreen (SPF 30 or higher) on any exposed skin.   · See a dentist one or two times a year for checkups and to have your teeth cleaned. · Wear a seat belt in the car. Follow your doctor's advice about when to have certain tests. These tests can spot problems early. For everyone  · Cholesterol. Have the fat (cholesterol) in your blood tested after age 21. Your doctor will tell you how often to have this done based on your age, family history, or other things that can increase your risk for heart disease. · Blood pressure. Have your blood pressure checked during a routine doctor visit. Your doctor will tell you how often to check your blood pressure based on your age, your blood pressure results, and other factors. · Vision. Talk with your doctor about how often to have a glaucoma test.  · Diabetes. Ask your doctor whether you should have tests for diabetes. · Colon cancer. Your risk for colorectal cancer gets higher as you get older. Some experts say that adults should start regular screening at age 48 and stop at age 76. Others say to start before age 48 or continue after age 76. Talk with your doctor about your risk and when to start and stop screening. For women  · Breast exam and mammogram. Talk to your doctor about when you should have a clinical breast exam and a mammogram. Medical experts differ on whether and how often women under 50 should have these tests. Your doctor can help you decide what is right for you. · Cervical cancer screening test and pelvic exam. Begin with a Pap test at age 24. The test often is part of a pelvic exam. Starting at age 27, you may choose to have a Pap test, an HPV test, or both tests at the same time (called co-testing). Talk with your doctor about how often to have testing. · Tests for sexually transmitted infections (STIs). Ask whether you should have tests for STIs. You may be at risk if you have sex with more than one person, especially if your partners do not wear condoms.   For men  · Tests for sexually transmitted infections (STIs). Ask whether you should have tests for STIs. You may be at risk if you have sex with more than one person, especially if you do not wear a condom. · Testicular cancer exam. Ask your doctor whether you should check your testicles regularly. · Prostate exam. Talk to your doctor about whether you should have a blood test (called a PSA test) for prostate cancer. Experts differ on whether and when men should have this test. Some experts suggest it if you are older than 39 and are -American or have a father or brother who got prostate cancer when he was younger than 72. When should you call for help? Watch closely for changes in your health, and be sure to contact your doctor if you have any problems or symptoms that concern you. Where can you learn more? Go to http://www.lutz.com/  Enter P072 in the search box to learn more about \"Well Visit, Ages 25 to 48: Care Instructions. \"  Current as of: May 27, 2020               Content Version: 12.6  © 2006-2020 [employer]. Care instructions adapted under license by [employer] (which disclaims liability or warranty for this information). If you have questions about a medical condition or this instruction, always ask your healthcare professional. [place] [address] any warranty or liability for your use of this information.

## 2020-10-06 NOTE — PROGRESS NOTES
[facility]  [email or web address]  [phone]    Tamie Alves MD, [facility]       Annual Gynecologic Exam  WWE 40-60  Chief Complaint   Patient presents with    Well Woman       Enmanuel Reid is a 54 y.o.  [address]  female who presents for an annual exam.  Patient's last menstrual period was 2020. Jak Ro She does not report additional concerns today. Menstrual status:  Her periods are light, moderate. She does not report dysmenorrhea/painful menses. She does not report irregular bleeding. Sexual history and Contraception:  Social History     Substance and Sexual Activity   Sexual Activity Yes    Partners: Male    Birth control/protection: None       She does not reports new sexual partner(s) in the last year. The patient does not request STD testing. Preventive Medicine History:  Her most recent Pap smear result: normal was obtained in 2017    Her most recent HR HPV screen was Negative obtained in     She does have a history of CORINE 2, 3 or cervical cancer. Breast health:  Last mammogram: NORMAL  A mammogram was scheduled for today. Breast cancer family updated: see FH. Bone health: Jak Ro She does not have a history of osteopenia/osteoporosis. Osteoporosis family history updated: see FH. Past Medical History:   Diagnosis Date    Breast mass     left    Dysplasia of cervix, low grade (CORINE 1) 3/31/10    ecto    Genital HSV     Hx of mammogram 01/15/2019    Normal    Hx of mammogram 13    need addt'l views    Pap smear for cervical cancer screening 12; 17    negative, HPV negative; negative, HPV negative     OB History    Para Term  AB Living   2 1 1 0 1 1   SAB TAB Ectopic Molar Multiple Live Births   0 1 0   0        # Outcome Date GA Lbr Mike/2nd Weight Sex Delivery Anes PTL Lv   2 TAB            1 Term               Obstetric Comments   Menarche:  14. LMP: 2/23/15.   # of Children:  1. Age at Delivery of First Child:  24.   Hysterectomy/oophorectomy:  NO/NO. Breast Bx:  no.  Hx of Breast Feeding:  no. BCP:  no. Hormone therapy:  no.        Past Surgical History:   Procedure Laterality Date    HX BREAST BIOPSY Left 4/2/2015    LEFT BREAST EXCISIONAL BIOPSY WITH ULTRASOUND performed by Rebecca Queen MD at [address] HX COLPOSCOPY  3/31/10    CIN1    HX GYN  1998    cyst removal    HX GYN  2002    fibroid removed: laparotomy     Family History   Problem Relation Age of Onset    No Known Problems Mother     No Known Problems Father     No Known Problems Brother     No Known Problems Brother     No Known Problems Daughter      Social History     Socioeconomic History    Marital status:      Spouse name: Not on file    Number of children: Not on file    Years of education: Not on file    Highest education level: Not on file   Occupational History    Not on file   Social Needs    Financial resource strain: Not on file    Food insecurity     Worry: Not on file     Inability: Not on file    Transportation needs     Medical: Not on file     Non-medical: Not on file   Tobacco Use    Smoking status: Current Every Day Smoker     Types: Cigarettes    Smokeless tobacco: Never Used    Tobacco comment: Never used vapor or e-cigs    Substance and Sexual Activity    Alcohol use:  Yes     Alcohol/week: 1.0 standard drinks     Types: 1 Standard drinks or equivalent per week    Drug use: No    Sexual activity: Yes     Partners: Male     Birth control/protection: None   Lifestyle    Physical activity     Days per week: Not on file     Minutes per session: Not on file    Stress: Not on file   Relationships    Social connections     Talks on phone: Not on file     Gets together: Not on file     Attends Druze service: Not on file     Active member of club or organization: Not on file     Attends meetings of clubs or organizations: Not on file Relationship status: Not on file    Intimate partner violence     Fear of current or ex partner: Not on file     Emotionally abused: Not on file     Physically abused: Not on file     Forced sexual activity: Not on file   Other Topics Concern    Not on file   Social History Narrative    Not on file       Allergies   Allergen Reactions    Diflucan [Fluconazole] Swelling     \"swollen face\"       Current Outpatient Medications   Medication Sig    valACYclovir (VALTREX) 500 mg tablet Please take one tab by mouth 2 times a day for 3 days.  aspirin/salicylamide/caffeine (BC HEADACHE POWDER PO) Take  by mouth.  IBUPROFEN (MOTRIN PO) Take  by mouth.  norethindrone ac-eth estradiol (MICROGESTIN 1.5/30, 21,) 1.5-30 mg-mcg tab Take 1 Tab by mouth daily.  HYDROcodone-acetaminophen (NORCO) 7.5-325 mg per tablet Take 1 Tab by mouth every four (4) hours as needed for Pain. Max Daily Amount: 6 Tabs.  HYDROcodone-acetaminophen (NORCO) 7.5-325 mg per tablet Take 1 Tab by mouth every four (4) hours as needed for Pain. Max Daily Amount: 6 Tabs.  multivitamin (ONE A DAY) tablet Take 1 tablet by mouth daily.  BIOTIN PO Take  by mouth. No current facility-administered medications for this visit.         Patient Active Problem List   Diagnosis Code    Depression F32.9    Major depressive disorder, recurrent episode, severe, without mention of psychotic behavior F33.2    Borderline personality disorder ([facility] [address] F60.3    Atypical hyperplasia of left breast N60.92       Review of Systems - History obtained from the patient  Constitutional: negative for weight loss, fever, night sweats  HEENT: negative for hearing loss, earache, congestion, snoring, sorethroat  CV: negative for chest pain, palpitations, edema  Resp: negative for cough, shortness of breath, wheezing  GI: negative for change in bowel habits, abdominal pain, black or bloody stools  : negative for frequency, dysuria, hematuria, vaginal discharge  MSK: negative for back pain, joint pain, muscle pain  Breast: negative for breast lumps, nipple discharge, galactorrhea  Skin :negative for itching, rash, hives  Neuro: negative for dizziness, headache, confusion, weakness  Psych: negative for anxiety, depression, change in mood  Heme/lymph: negative for bleeding, bruising, pallor      (WWE continued)     Physical Exam  Visit Vitals  /87   Ht 5' 6\" (1.676 m)   Wt 148 lb 6.4 oz (67.3 kg)   LMP 09/24/2020   BMI 23.95 kg/m²       Constitutional  · Appearance: well-nourished, well developed, alert, in no acute distress    HENT  · Head and Face: appears normal    Neck  · Inspection/Palpation: normal appearance, no masses or tenderness  · Lymph Nodes: no lymphadenopathy present  · Thyroid: gland size normal, nontender, no nodules or masses present on palpation    Chest  · Respiratory Effort: breathing unlabored  · Auscultation: normal breath sounds    Cardiovascular  · Heart:  · Auscultation: regular rate and rhythm without murmur    Breasts  · Inspection of Breasts: breasts symmetrical, no skin changes, no discharge present, nipple appearance normal, no skin retraction present  · Palpation of Breasts and Axillae: no masses present on palpation, no breast tenderness  · Axillary Lymph Nodes: no lymphadenopathy present    Gastrointestinal  · Abdominal Examination: abdomen non-tender to palpation, normal bowel sounds, no masses present  · Liver and spleen: no hepatomegaly present, spleen not palpable  · Hernias: no hernias identified    Genitourinary  · External Genitalia: normal appearance for age, no discharge present, no tenderness present, no inflammatory lesions present, no masses present, without atrophy present  · Vagina: normal vaginal vault without central or paravaginal defects, no discharge present, no inflammatory lesions present, no masses present  · Bladder: non-tender to palpation  · Urethra: appears normal  · Cervix: normal   · Uterus: normal size, shape and consistency  · Adnexa: no adnexal tenderness present, no adnexal masses present  · Perineum: perineum within normal limits, no evidence of trauma, no rashes or skin lesions present  · Anus: anus within normal limits, no hemorrhoids present  · Inguinal Lymph Nodes: no lymphadenopathy present    Skin  · General Inspection: no rash, no lesions identified    Neurologic/Psychiatric  · Mental Status:  · Orientation: grossly oriented to person, place and time  · Mood and Affect: mood normal, affect appropriate    Assessment:  54 y.o.  for well woman exam  Encounter Diagnoses   Name Primary?  Abnormal mammogram Yes    Screening for venereal disease        Plan:  The patient was counseled about diet, exercise, healthy lifestyle  We discussed current self breast exam and mammogram recommendations  We discussed current pap smear and HR HPV testing guidelines  We recommend follow up in one year for routine annual gynecologic exam or sooner if needed  We recommend follow up with a primary care physician for any chronic medical problems or non-gynecologic concerns    We discussed calcium/vitamin D/weight bearing exercise and osteoporosis prevention  Handouts were given to the patient  Std swab only per pt req  Disc fu breast imaging per mmg report today   Discussed safer sex practices, condom use and risk factors for sexually transmitted diseases. Folllow up:  [x] return for annual well woman exam in one year or sooner if she is having problems  [] follow up and ultrasound  [x] mammogram  [] 6 months  [] 3 months  [] 1 month    Orders Placed This Encounter    [facility] MAMMO LT DX INCL CAD    CT/NG/T.VAGINALIS AMPLIFICATION       Results for orders placed or performed during the hospital encounter of 10/06/20   [facility] MAMMO BI SCREENING INCL CAD    Narrative    STUDY: Bilateral digital screening mammogram    INDICATION:  Screening. History of left breast ADH status post excisional  biopsy.     COMPARISON: 2019, 2015    BREAST COMPOSITION:  The breasts are extremely dense, which lowers the  sensitivity of mammography. FINDINGS: Bilateral digital screening mammography was performed and is  interpreted in conjunction with a computer assisted detection (CAD) system. In  the right breast, no suspicious masses or calcifications are identified. In the  left breast superiorly at 12:00 posterior depth there are indeterminate  calcifications. Further from the nipple on the MLO view only there are  additional microcalcifications at even further posterior depth. Impression    IMPRESSION:  BI-RADS 0: Incomplete. Needs additional imaging evaluation. 2 groups of left  breast calcifications    RECOMMENDATIONS:  Left magnification CC and ML, full ML, left X CCL. The patient will be notified of these results.

## 2020-10-08 LAB
C TRACH RRNA SPEC QL NAA+PROBE: POSITIVE
N GONORRHOEA RRNA SPEC QL NAA+PROBE: NEGATIVE
T VAGINALIS DNA SPEC QL NAA+PROBE: NEGATIVE

## 2020-10-08 RX ORDER — METRONIDAZOLE 500 MG/1
TABLET ORAL
Qty: 4 TAB | Refills: 0 | Status: SHIPPED | OUTPATIENT
Start: 2020-10-08 | End: 2021-11-09

## 2020-10-08 NOTE — PROGRESS NOTES
Error: it was chl not trich ,new rx sent 10/17/2020      Update FS with date. trich  This is a sexually transmitted disease. Rx sent: flagyl 2g    Intimate/sexual partner(s) need to be treated by their MD/PCP/clinic. The patient should notify them, or she can contact the health department for assistance. Rec abstinence until patient and partner(s) are treated +1 week. I recommend consistent condom use to decrease STD in the future. I recommend returning to see me to reevaluate infection in about three months. Please schedule appointment. I also recommend testing for other STDs if it hasn't been done recently: such as HIV/hepatitis and syphilis. She can RTC for lab work, or we can test at follow up visit.

## 2020-10-13 NOTE — PROGRESS NOTES
This is chlamydia not trich, right? Called and spoke with patient. I advised her of + trich. Patient was very upset. I explained that she may come in for a follow up and to get std blood work done. She states understanding and will call the office back, if she wants to do that.

## 2020-10-14 ENCOUNTER — HOSPITAL ENCOUNTER (OUTPATIENT)
Dept: MAMMOGRAPHY | Age: 45
Discharge: HOME | End: 2020-10-14
Attending: OBSTETRICS & GYNECOLOGY
Payer: COMMERCIAL

## 2020-10-14 DIAGNOSIS — R92.8 ABNORMAL MAMMOGRAM: ICD-10-CM

## 2020-10-14 PROCEDURE — 77065 DX MAMMO INCL CAD UNI: CPT

## 2020-10-15 ENCOUNTER — TELEPHONE (OUTPATIENT)
Dept: OBGYN CLINIC | Age: 45
End: 2020-10-15

## 2020-10-15 NOTE — TELEPHONE ENCOUNTER
Message left at 3:19PM      54year old patient last seen in the office on 10/6/2020    Patient left a message about getting another prescription .     This nurse attempted to reach the patient and left a message for the patient to call the office back in the am

## 2020-10-15 NOTE — PROGRESS NOTES
Left MMG normal/dense. Please update PMH/HM per protocol and include  BIRADS level (0-6), date of imaging (mm/dd/yy)  add \"dense\" or \"very dense\" to comments, see report  add Radiologist impression, if indicated: comments/measurements from radiologist related to lymph nodes/cysts/mass  If not bilateral: record side of imaging  TICKLE if early follow up recommended in Rad notes. Offer patient bilateral screening breast US to improve breast screening if no [address] message sent. Order: [id number] for US Whole Breat Screening BI complete   Patient may decline, or schedule a consult to discuss breast cancer risks in more detail. Document in chart if patient declines additional imaging or consult. TICKLE and follow up if [address] message not read.

## 2020-10-16 NOTE — TELEPHONE ENCOUNTER
This nurse called the patient back and she states the prescription was sent to the wrong pharmacy. This nurse contacted the [facility] pharmacy where the prescription was sent and the pharmacy advised that the prescription was picked up on 10/14/2020    This nurse attempted to reach the patient back to advised that it was already picked up left a detailed message for the patient.

## 2020-10-17 RX ORDER — AZITHROMYCIN 500 MG/1
1000 TABLET, FILM COATED ORAL
Qty: 2 TAB | Refills: 0 | Status: SHIPPED | OUTPATIENT
Start: 2020-10-17 | End: 2020-10-17

## 2020-10-17 NOTE — PROGRESS NOTES
Error in initial recs. CHL+ update (not trich)  New rx sent azithro 1 g  Update FS. rx sent  This is a sexually transmitted disease. Intimate/sexual partner(s) need to be treated by their MD/PCP/clinic. The patient should notify them, or she can contact the health department for assistance. Rec abstinence until patient and partner(s) are treated +1 week. I recommend consistent condom use to decrease STD in the future. I recommend returning to see me to reevaluate infection in about three months. Please schedule appointment. I also recommend testing for other STDs if it hasn't been done recently: such as HIV/hepatitis and syphilis. She can RTC for lab work, or we can test at follow up visit.

## 2020-10-18 NOTE — PROGRESS NOTES
Add L breast dx mammo results to [facility] and removed from tickler.

## 2020-10-19 NOTE — PROGRESS NOTES
Called and advised patient with lab results. Patient has verbalized that she has started on medication prescribed.

## 2021-02-23 ENCOUNTER — TELEPHONE (OUTPATIENT)
Dept: OBGYN CLINIC | Age: 46
End: 2021-02-23

## 2021-02-23 NOTE — TELEPHONE ENCOUNTER
Rec problem visit, have not recently tested her for BV and can check for other infections. Can she come tomorrow? Or offer a virtual visit, but prefer OV.     Miguel Horvath MD

## 2021-02-23 NOTE — TELEPHONE ENCOUNTER
Patient has been advised, due to her work schedule she can not work this out. She will try a patient first to see if they can see her later in the evening.

## 2021-02-23 NOTE — TELEPHONE ENCOUNTER
Patient of TP    Asking for a RX for Flagyl for possible BV. She said she is working 7 am to 7 pm 7 days per week and is unable to come in for a visit right now. She said she has a white thin vaginal discharge with odor. She said she started with these symptoms after she did what you instructed her not to do, take a bubble bath.        Please advise      [facility] [place]

## 2021-05-11 ENCOUNTER — OFFICE VISIT (OUTPATIENT)
Dept: OBGYN CLINIC | Age: 46
End: 2021-05-11
Payer: COMMERCIAL

## 2021-05-11 ENCOUNTER — TELEPHONE (OUTPATIENT)
Dept: OBGYN CLINIC | Age: 46
End: 2021-05-11

## 2021-05-11 VITALS
BODY MASS INDEX: 24.2 KG/M2 | SYSTOLIC BLOOD PRESSURE: 151 MMHG | DIASTOLIC BLOOD PRESSURE: 97 MMHG | HEART RATE: 92 BPM | WEIGHT: 150.6 LBS | HEIGHT: 66 IN

## 2021-05-11 DIAGNOSIS — R80.9 PROTEINURIA, UNSPECIFIED TYPE: ICD-10-CM

## 2021-05-11 DIAGNOSIS — R03.0 ELEVATED BLOOD PRESSURE READING: ICD-10-CM

## 2021-05-11 DIAGNOSIS — R30.9 URINARY PAIN: ICD-10-CM

## 2021-05-11 DIAGNOSIS — R31.9 HEMATURIA, UNSPECIFIED TYPE: ICD-10-CM

## 2021-05-11 DIAGNOSIS — R35.0 URINARY FREQUENCY: Primary | ICD-10-CM

## 2021-05-11 DIAGNOSIS — N89.8 VAGINAL DISCHARGE: ICD-10-CM

## 2021-05-11 LAB
BILIRUB UR QL STRIP: NEGATIVE
GLUCOSE UR-MCNC: NEGATIVE MG/DL
KETONES P FAST UR STRIP-MCNC: NEGATIVE MG/DL
PH UR STRIP: 5 [PH] (ref 4.6–8)
PROT UR QL STRIP: NORMAL
SP GR UR STRIP: 1.02 (ref 1–1.03)
UA UROBILINOGEN AMB POC: NORMAL (ref 0.2–1)
URINALYSIS CLARITY POC: NORMAL
URINALYSIS COLOR POC: NORMAL
URINE BLOOD POC: NORMAL
URINE LEUKOCYTES POC: NORMAL
URINE NITRITES POC: POSITIVE

## 2021-05-11 PROCEDURE — 99214 OFFICE O/P EST MOD 30 MIN: CPT | Performed by: OBSTETRICS & GYNECOLOGY

## 2021-05-11 PROCEDURE — 81003 URINALYSIS AUTO W/O SCOPE: CPT | Performed by: OBSTETRICS & GYNECOLOGY

## 2021-05-11 RX ORDER — ASCORBIC ACID 250 MG
TABLET ORAL
COMMUNITY
End: 2021-11-23

## 2021-05-11 RX ORDER — SULFAMETHOXAZOLE AND TRIMETHOPRIM 800; 160 MG/1; MG/1
1 TABLET ORAL 2 TIMES DAILY
Qty: 10 TAB | Refills: 0 | Status: SHIPPED | OUTPATIENT
Start: 2021-05-11 | End: 2021-05-13

## 2021-05-11 NOTE — PROGRESS NOTES
[facility]  [email or web address]  [phone]    Uri Perry MD, [address] [facility]       OB/GYN Problem visit    Chief Complaint: No chief complaint on file. ? UTI    Last or next WWE is: 10/6/2020    History of Present Illness: This is a new problem being evaluated by this provider. The patient is a 54 y.o.  female. Pt reports urinary frequency & it hurts/burns when is voiding at the end. Pt reports she see's blood in her urine. This has been going on since this morning. She reports the vaginal area is itchy & that has been going on since Saturday. She just finished her cycle & reports her hormones could be off. Denies new soaps & detergents. Denies new sexual partners. Pt reports a little more discharge than usual, with a slight odor; denies change in color & texture. She reports the symptoms are has worsened. Aggravating factors include urinating. Alleviating factors include none. She does not have other concerns. LMP: Patient's last menstrual period was 2021 (exact date). PFSH:  Past Medical History:   Diagnosis Date    Abnormal mammogram 10/06/2020    BI-RADS 0: Incomplete. Needs additional imaging evaluation. 2 groups of left breast calcifications    Abnormal mammogram of left breast 10/14/2020    BI-RADS Assessment Category 2: Benign finding.  Milk of calcium calcifications are benign    Breast mass     left    Dysplasia of cervix, low grade (CORINE 1) 3/31/10    ecto    Genital HSV     Hx of mammogram 01/15/2019    Normal    Hx of mammogram 13    need addt'l views    Pap smear for cervical cancer screening 12; 17    negative, HPV negative; negative, HPV negative     Past Surgical History:   Procedure Laterality Date    HX BREAST BIOPSY Left 2015    LEFT BREAST EXCISIONAL BIOPSY WITH ULTRASOUND performed by Tierra Hickman MD at [address] HX COLPOSCOPY  3/31/10    CIN1    HX GYN  1998    cyst removal    HX GYN  2002    fibroid removed: laparotomy     Family History   Problem Relation Age of Onset    No Known Problems Mother     No Known Problems Father     No Known Problems Brother     No Known Problems Brother     No Known Problems Daughter      Social History     Tobacco Use    Smoking status: Current Every Day Smoker     Types: Cigarettes    Smokeless tobacco: Never Used    Tobacco comment: Never used vapor or e-cigs    Substance Use Topics    Alcohol use: Yes     Alcohol/week: 1.0 standard drinks     Types: 1 Standard drinks or equivalent per week    Drug use: No     Allergies   Allergen Reactions    Diflucan [Fluconazole] Swelling     \"swollen face\"     Current Outpatient Medications   Medication Sig    ascorbic acid, vitamin C, (Vitamin C) 250 mg tablet Take  by mouth.  trimethoprim-sulfamethoxazole (Bactrim DS) 160-800 mg per tablet Take 1 Tab by mouth two (2) times a day for 5 days.  valACYclovir (VALTREX) 500 mg tablet Please take one tab by mouth 2 times a day for 3 days.  metroNIDAZOLE (FLAGYL) 500 mg tablet Take 2 grams by mouth once. Avoid etoh    aspirin/salicylamide/caffeine (BC HEADACHE POWDER PO) Take  by mouth.  IBUPROFEN (MOTRIN PO) Take  by mouth.  norethindrone ac-eth estradiol (MICROGESTIN 1.5/30, 21,) 1.5-30 mg-mcg tab Take 1 Tab by mouth daily.  HYDROcodone-acetaminophen (NORCO) 7.5-325 mg per tablet Take 1 Tab by mouth every four (4) hours as needed for Pain. Max Daily Amount: 6 Tabs.  HYDROcodone-acetaminophen (NORCO) 7.5-325 mg per tablet Take 1 Tab by mouth every four (4) hours as needed for Pain. Max Daily Amount: 6 Tabs.  multivitamin (ONE A DAY) tablet Take 1 tablet by mouth daily.  BIOTIN PO Take  by mouth. No current facility-administered medications for this visit.         Review of Systems:  History obtained from the patient  Constitutional: negative for fevers, chills and weight loss  ENT ROS: negative for - hearing change, oral lesions or visual changes  Respiratory: negative for cough, wheezing or dyspnea on exertion  Cardiovascular: negative for chest pain, irregular heart beats, exertional chest pressure/discomfort  Gastrointestinal: negative for dysphagia, nausea and vomiting  Genito-Urinary ROS:  see HPI  Inteument/breast: negative for rash, breast lump and nipple discharge  Musculoskeletal:negative for stiff joints, neck pain and muscle weakness  Endocrine ROS: negative for - breast changes, galactorrhea or temperature intolerance  Hematological and Lymphatic ROS: negative for - blood clots, bruising or swollen lymph nodes    Physical Exam:  Visit Vitals  BP (!) 151/97 (BP 1 Location: Right upper arm, BP Patient Position: Sitting, BP Cuff Size: Adult)   Pulse 92   Ht 5' 6\" (1.676 m)   Wt 150 lb 9.6 oz (68.3 kg)   BMI 24.31 kg/m²       GENERAL: alert, well appearing, and in no distress  HEAD: normocephalic, atraumatic. PULM: clear to auscultation, no wheezes, rales or rhonchi, symmetric air entry   COR: normal rate and regular rhythm, S1 and S2 normal   ABDOMEN: soft, nontender, nondistended, no masses or organomegaly   EGBUS: no lesions, no inflammation, no masses  VULVA: normal appearing vulva with no masses, tenderness or lesions  VAGINA: normal appearing vagina with normal color, no lesions, white and thin    discharge  CERVIX: normal appearing cervix without discharge or lesions, non tender  UTERUS: uterus is normal size, shape, consistency and nontender   ADNEXA: normal adnexa in size, nontender and no masses  NEURO: alert, oriented, normal speech    Assessment:  Encounter Diagnoses   Name Primary?     Urinary frequency Yes    Urinary pain     Vaginal discharge     Hematuria, unspecified type     Proteinuria, unspecified type     Elevated blood pressure reading        Plan:  The patient is advised that she should contact the office if she does not note improvement or if symptoms recur  Recommend follow up with PCP for non-gynecologic complaints and chronic medical problems. She should contact our office with any questions or concerns  She could keep her routine annual exam appointment. UTI precautions, pyelo precautions given  Disc potential causes and etiology of UTI  Notify MD if NI in symptoms or worsening symptoms: pain/fever  Recommend increased water intake, regular voiding, voiding after coitus, avoid bladder irritants  Pt notified we will contact them with culture results and alter treatment if needed  If symptoms persist after treatment or recur: rec follow up evaulation  We discussed potential causes of vaginal discharge/irritation. We discussed good vulvar hygiene. Recommended avoid vaginal irritants. Discussed use of mild soaps/detergents. Follow up if NI. Patient will be notified about swab results and prescription sent, if indicated. ]  Rec PCP fu /bp check    On this date, 5/11/2021,  I have spent 24 minutes reviewing previous notes, test results and face to face with the patient discussing the diagnosis and importance of compliance with the treatment plan as well as documenting on the day of the visit.         Orders Placed This Encounter    CULTURE, URINE    NUSWAB VAGINITIS PLUS ([facility])    AMB POC URINALYSIS DIP STICK MANUAL W/O MICRO    trimethoprim-sulfamethoxazole (Bactrim DS) 160-800 mg per tablet       Results for orders placed or performed in visit on 05/11/21   AMB POC URINALYSIS DIP STICK MANUAL W/O MICRO   Result Value Ref Range    Color (UA POC) Brown     Clarity (UA POC) Cloudy     Glucose (UA POC) Negative Negative    Bilirubin (UA POC) Negative Negative    Ketones (UA POC) Negative Negative    Specific gravity (UA POC) 1.020 1.001 - 1.035    Blood (UA POC) 4+ Negative    pH (UA POC) 5.0 4.6 - 8.0    Protein (UA POC) 2+ Negative    Urobilinogen (UA POC) normal 0.2 - 1    Nitrites (UA POC) Positive Negative    Leukocyte esterase (UA POC) 3+ Negative

## 2021-05-11 NOTE — TELEPHONE ENCOUNTER
Call received at 8:50am      54year old patient last seen in the office on 10/6/2020      Patient calling to say that for the past two days she has had urgency , frequency and burning with urination. Patient was placed on the schedule to be seen to day at 11:30am per Dr. Bustos Day    Patient verbalized understanding.

## 2021-05-11 NOTE — PATIENT INSTRUCTIONS
Urinary Tract Infection (UTI) in Women: Care Instructions Overview A urinary tract infection, or UTI, is a general term for an infection anywhere between the kidneys and the urethra (where urine comes out). Most UTIs are bladder infections. They often cause pain or burning when you urinate. UTIs are caused by bacteria and can be cured with antibiotics. Be sure to complete your treatment so that the infection does not get worse. Follow-up care is a key part of your treatment and safety. Be sure to make and go to all appointments, and call your doctor if you are having problems. It's also a good idea to know your test results and keep a list of the medicines you take. How can you care for yourself at home? · Take your antibiotics as directed. Do not stop taking them just because you feel better. You need to take the full course of antibiotics. · Drink extra water and other fluids for the next day or two. This will help make the urine less concentrated and help wash out the bacteria that are causing the infection. (If you have kidney, heart, or liver disease and have to limit fluids, talk with your doctor before you increase the amount of fluids you drink.) · Avoid drinks that are carbonated or have caffeine. They can irritate the bladder. · Urinate often. Try to empty your bladder each time. · To relieve pain, take a hot bath or lay a heating pad set on low over your lower belly or genital area. Never go to sleep with a heating pad in place. To prevent UTIs · Drink plenty of water each day. This helps you urinate often, which clears bacteria from your system. (If you have kidney, heart, or liver disease and have to limit fluids, talk with your doctor before you increase the amount of fluids you drink.) · Urinate when you need to. · If you are sexually active, urinate right after you have sex. · Change sanitary pads often.  
· Avoid douches, bubble baths, feminine hygiene sprays, and other feminine hygiene products that have deodorants. · After going to the bathroom, wipe from front to back. When should you call for help? Call your doctor now or seek immediate medical care if: 
  · Symptoms such as fever, chills, nausea, or vomiting get worse or appear for the first time.  
  · You have new pain in your back just below your rib cage. This is called flank pain.  
  · There is new blood or pus in your urine.  
  · You have any problems with your antibiotic medicine. Watch closely for changes in your health, and be sure to contact your doctor if: 
  · You are not getting better after taking an antibiotic for 2 days.  
  · Your symptoms go away but then come back. Where can you learn more? Go to http://www.gray.com/ Enter M039 in the search box to learn more about \"Urinary Tract Infection (UTI) in Women: Care Instructions. \" Current as of: June 29, 2020               Content Version: 12.8 © 2006-2021 [employer]. Care instructions adapted under license by [employer] (which disclaims liability or warranty for this information). If you have questions about a medical condition or this instruction, always ask your healthcare professional. [address] any warranty or liability for your use of this information.

## 2021-05-13 LAB — BACTERIA UR CULT: ABNORMAL

## 2021-05-13 RX ORDER — SULFAMETHOXAZOLE AND TRIMETHOPRIM 800; 160 MG/1; MG/1
1 TABLET ORAL 2 TIMES DAILY
Qty: 10 TAB | Refills: 0 | Status: SHIPPED | OUTPATIENT
Start: 2021-05-13 | End: 2021-05-18

## 2021-05-13 NOTE — TELEPHONE ENCOUNTER
Calling to say she accidentally threw away her Bactrim DS in the trash at work. Can she have a refill to send to a different pharmacy closer to home?       [place] [id number]

## 2021-05-14 LAB
A VAGINAE DNA VAG QL NAA+PROBE: NORMAL SCORE
BVAB2 DNA VAG QL NAA+PROBE: NORMAL SCORE
C ALBICANS DNA VAG QL NAA+PROBE: NEGATIVE
C GLABRATA DNA VAG QL NAA+PROBE: NEGATIVE
C TRACH DNA VAG QL NAA+PROBE: NEGATIVE
MEGA1 DNA VAG QL NAA+PROBE: NORMAL SCORE
N GONORRHOEA DNA VAG QL NAA+PROBE: NEGATIVE
T VAGINALIS DNA VAG QL NAA+PROBE: NEGATIVE

## 2021-05-14 NOTE — PROGRESS NOTES
Abnormal results. [employer] message sent, if active. Notify patient, if [employer] message not read, or not active on [address].   Update chart, PN labs/problem list, if needed  txd at visit  nuswab neg

## 2021-05-24 NOTE — PROGRESS NOTES
Reached pt via phone & had her verify her . I notified her of MD's recommendations. Pt reports she started the antibiotics & thinks she now has a yeast infection. I advised pt to try a 3 or 7 day Monistat & if sx don't improve to schedule an appointment to follow up.

## 2021-09-28 ENCOUNTER — TELEPHONE (OUTPATIENT)
Dept: OBGYN CLINIC | Age: 46
End: 2021-09-28

## 2021-09-28 RX ORDER — VALACYCLOVIR HYDROCHLORIDE 500 MG/1
TABLET, FILM COATED ORAL
Qty: 30 TABLET | Refills: 1 | Status: SHIPPED | OUTPATIENT
Start: 2021-09-28 | End: 2021-11-09 | Stop reason: SDUPTHER

## 2021-09-28 RX ORDER — VALACYCLOVIR HYDROCHLORIDE 500 MG/1
TABLET, FILM COATED ORAL
Qty: 30 TABLET | Refills: 1 | OUTPATIENT
Start: 2021-09-28

## 2021-09-28 NOTE — TELEPHONE ENCOUNTER
Pt calling for refills of   valACYclovir (VALTREX) 500 mg tablet       This RN to refill to get pt to [facility] on 11/9. This RN to send refill to pt preferred pharmacy. Pt verbalized understanding.     See pended order

## 2021-09-29 ENCOUNTER — TELEPHONE (OUTPATIENT)
Dept: OBGYN CLINIC | Age: 46
End: 2021-09-29

## 2021-09-29 NOTE — TELEPHONE ENCOUNTER
Call received at 8:34am    Patient calling back from yesterday regarding her prescription refill      Patient was advised that medication has been sent and the pharmacy it was sent to. Patient was offered to have the prescription sent to her preferred pharmacy and she declined    Patient verbalized understanding.

## 2021-11-09 ENCOUNTER — OFFICE VISIT (OUTPATIENT)
Dept: OBGYN CLINIC | Age: 46
End: 2021-11-09
Payer: COMMERCIAL

## 2021-11-09 VITALS
HEIGHT: 66 IN | SYSTOLIC BLOOD PRESSURE: 147 MMHG | BODY MASS INDEX: 18.45 KG/M2 | HEART RATE: 80 BPM | WEIGHT: 114.8 LBS | DIASTOLIC BLOOD PRESSURE: 97 MMHG

## 2021-11-09 DIAGNOSIS — Z01.419 ENCOUNTER FOR WELL WOMAN EXAM WITH ROUTINE GYNECOLOGICAL EXAM: ICD-10-CM

## 2021-11-09 DIAGNOSIS — R19.00 PELVIC MASS: ICD-10-CM

## 2021-11-09 DIAGNOSIS — Z71.6 ENCOUNTER FOR TOBACCO USE CESSATION COUNSELING: ICD-10-CM

## 2021-11-09 DIAGNOSIS — Z12.4 ENCOUNTER FOR PAPANICOLAOU SMEAR FOR CERVICAL CANCER SCREENING: Primary | ICD-10-CM

## 2021-11-09 DIAGNOSIS — R21 RASH: ICD-10-CM

## 2021-11-09 DIAGNOSIS — N89.8 VAGINAL LESION: ICD-10-CM

## 2021-11-09 DIAGNOSIS — Z72.0 TOBACCO USE: ICD-10-CM

## 2021-11-09 DIAGNOSIS — Z20.2 EXPOSURE TO SEXUALLY TRANSMITTED DISEASE (STD): ICD-10-CM

## 2021-11-09 PROCEDURE — 99212 OFFICE O/P EST SF 10 MIN: CPT | Performed by: OBSTETRICS & GYNECOLOGY

## 2021-11-09 PROCEDURE — 99396 PREV VISIT EST AGE 40-64: CPT | Performed by: OBSTETRICS & GYNECOLOGY

## 2021-11-09 RX ORDER — NYSTATIN AND TRIAMCINOLONE ACETONIDE 100000; 1 [USP'U]/G; MG/G
OINTMENT TOPICAL 2 TIMES DAILY
Qty: 30 G | Refills: 0 | Status: SHIPPED | OUTPATIENT
Start: 2021-11-09 | End: 2021-11-16

## 2021-11-09 RX ORDER — VALACYCLOVIR HYDROCHLORIDE 500 MG/1
TABLET, FILM COATED ORAL
Qty: 30 TABLET | Refills: 5 | Status: SHIPPED | OUTPATIENT
Start: 2021-11-09 | End: 2022-09-28

## 2021-11-09 NOTE — PATIENT INSTRUCTIONS
Stopping Smoking: Care Instructions  Your Care Instructions     Cigarette smokers crave the nicotine in cigarettes. Giving it up is much harder than simply changing a habit. Your body has to stop craving the nicotine. It is hard to quit, but you can do it. There are many tools that people use to quit smoking. You may find that combining tools works best for you. There are several steps to quitting. First you get ready to quit. Then you get support to help you. After that, you learn new skills and behaviors to become a nonsmoker. For many people, a necessary step is getting and using medicine. Your doctor will help you set up the plan that best meets your needs. You may want to attend a smoking cessation program to help you quit smoking. When you choose a program, look for one that has proven success. Ask your doctor for ideas. You will greatly increase your chances of success if you take medicine as well as get counseling or join a cessation program.  Some of the changes you feel when you first quit tobacco are uncomfortable. Your body will miss the nicotine at first, and you may feel short-tempered and grumpy. You may have trouble sleeping or concentrating. Medicine can help you deal with these symptoms. You may struggle with changing your smoking habits and rituals. The last step is the tricky one: Be prepared for the smoking urge to continue for a time. This is a lot to deal with, but keep at it. You will feel better. Follow-up care is a key part of your treatment and safety. Be sure to make and go to all appointments, and call your doctor if you are having problems. It's also a good idea to know your test results and keep a list of the medicines you take. How can you care for yourself at home? · Ask your family, friends, and coworkers for support. You have a better chance of quitting if you have help and support.   · Join a support group, such as Nicotine Anonymous, for people who are trying to quit smoking. · Consider signing up for a smoking cessation program, such as the American Lung Association's Freedom from Smoking program.  · Get text messaging support. Go to the website at www.smokefree. gov to sign up for the [facility] program.  · Set a quit date. Pick your date carefully so that it is not right in the middle of a big deadline or stressful time. Once you quit, do not even take a puff. Get rid of all ashtrays and lighters after your last cigarette. Clean your house and your clothes so that they do not smell of smoke. · Learn how to be a nonsmoker. Think about ways you can avoid those things that make you reach for a cigarette. ? Avoid situations that put you at greatest risk for smoking. For some people, it is hard to have a drink with friends without smoking. For others, they might skip a coffee break with coworkers who smoke. ? Change your daily routine. Take a different route to work or eat a meal in a different place. · Cut down on stress. Calm yourself or release tension by doing an activity you enjoy, such as reading a book, taking a hot bath, or gardening. · Talk to your doctor or pharmacist about nicotine replacement therapy, which replaces the nicotine in your body. You still get nicotine but you do not use tobacco. Nicotine replacement products help you slowly reduce the amount of nicotine you need. These products come in several forms, many of them available over-the-counter:  ? Nicotine patches  ? Nicotine gum and lozenges  ? Nicotine inhaler  · Ask your doctor about bupropion (Wellbutrin) or varenicline (Chantix), which are prescription medicines. They do not contain nicotine. They help you by reducing withdrawal symptoms, such as stress and anxiety. · Some people find hypnosis, acupuncture, and massage helpful for ending the smoking habit. · Eat a healthy diet and get regular exercise. Having healthy habits will help your body move past its craving for nicotine.   · Be prepared to keep trying. Most people are not successful the first few times they try to quit. Do not get mad at yourself if you smoke again. Make a list of things you learned and think about when you want to try again, such as next week, next month, or next year. Where can you learn more? Go to http://www.gray.com/  Enter [id number] in the search box to learn more about \"Stopping Smoking: Care Instructions. \"  Current as of: February 11, 2021               Content Version: 13.0  © 9819-2965 [employer]. Care instructions adapted under license by [employer] (which disclaims liability or warranty for this information). If you have questions about a medical condition or this instruction, always ask your healthcare professional. [address] any warranty or liability for your use of this information. Deciding About Using Medicines To Quit Smoking  How can you decide about using medicines to quit smoking? What are the medicines you can use? Your doctor may prescribe varenicline (Chantix) or bupropion (Zyban). These medicines can help you cope with cravings for tobacco. They are pills that don't contain nicotine. You also can use nicotine replacement products. These do contain nicotine. There are many types. · Gum and lozenges slowly release nicotine into your mouth. · Patches stick to your skin. They slowly release nicotine into your bloodstream.  · An inhaler has a kilgore that contains nicotine. You breathe in a puff of nicotine vapor through your mouth and throat. · Nasal spray releases a mist that contains nicotine. What are key points about this decision? · Using medicines can double your chances of quitting smoking. They can ease cravings and withdrawal symptoms. · Getting counseling along with using medicine can raise your chances of quitting even more.   · If you smoke fewer than 5 cigarettes a day, you may not need medicines to help you quit smoking. · These medicines have less nicotine than cigarettes. And by itself, nicotine is not nearly as harmful as smoking. The tars, carbon monoxide, and other toxic chemicals in tobacco cause the harmful effects. · The side effects of nicotine replacement products depend on the type of product. For example, a patch can make your skin red and itchy. Medicines in pill form can make you sick to your stomach. They can also cause dry mouth and trouble sleeping. For most people, the side effects are not bad enough to make them stop using the products. Why might you choose to use medicines to quit smoking? · You have tried on your own to stop smoking, but you were not able to stop. · You smoke more than 5 cigarettes a day. · You want to increase your chances of quitting smoking. · You want to reduce your cravings and withdrawal symptoms. · You feel the benefits of medicine outweigh the side effects. Why might you choose not to use medicine? · You want to try quitting on your own by stopping all at once (\"cold turkey\"). · You want to cut back slowly on the number of cigarettes you smoke. · You smoke fewer than 5 cigarettes a day. · You do not like using medicine. · You feel the side effects of medicines outweigh the benefits. · You are worried about the cost of medicines. Your decision  Thinking about the facts and your feelings can help you make a decision that is right for you. Be sure you understand the benefits and risks of your options, and think about what else you need to do before you make the decision. Where can you learn more? Go to http://www.gray.com/  Enter F904 in the search box to learn more about \"Deciding About Using Medicines To Quit Smoking. \"  Current as of: February 11, 2021               Content Version: 13.0  © 0602-2725 [employer]wise[employer] Inc[employer].    Care instructions adapted under license by [employer] (which disclaims liability or warranty for this information). If you have questions about a medical condition or this instruction, always ask your healthcare professional. [address] any warranty or liability for your use of this information. Learning About Benefits From Quitting Smoking  How does quitting smoking make you healthier? If you're thinking about quitting smoking, you may have a few reasons to be smoke-free. Your health may be one of them. · When you quit smoking, you lower your risks for cancer, lung disease, heart attack, stroke, blood vessel disease, and blindness from macular degeneration. · When you're smoke-free, you get sick less often, and you heal faster. You are less likely to get colds, flu, bronchitis, and pneumonia. · As a nonsmoker, you may find that your mood is better and you are less stressed. When and how will you feel healthier? Quitting has real health benefits that start from day 1 of being smoke-free. And the longer you stay smoke-free, the healthier you get and the better you feel. The first hours  · After just 20 minutes, your blood pressure and heart rate go down. That means there's less stress on your heart and blood vessels. · Within 12 hours, the level of carbon monoxide in your blood drops back to normal. That makes room for more oxygen. With more oxygen in your body, you may notice that you have more energy than when you smoked. After 2 weeks  · Your lungs start to work better. · Your risk of heart attack starts to drop. After 1 month  · When your lungs are clear, you cough less and breathe deeper, so it's easier to be active. · Your sense of taste and smell return. That means you can enjoy food more than you have since you started smoking. Over the years  · Over the years, your risks of heart disease, heart attack, and stroke are lower. · After 10 years, your risk of dying from lung cancer is cut by about half.  And your risk for many other types of cancer is lower too.  How would quitting help others in your life? When you quit smoking, you improve the health of everyone who now breathes in your smoke. · Their heart, lung, and cancer risks drop, much like yours. · They are sick less. For babies and small children, living smoke-free means they're less likely to have ear infections, pneumonia, and bronchitis. · If you're a woman who is or will be pregnant someday, quitting smoking means a healthier . · Children who are close to you are less likely to become adult smokers. Where can you learn more? Go to http://www.gray.com/  Enter O319 in the search box to learn more about \"Learning About Benefits From Quitting Smoking. \"  Current as of: 2021               Content Version: 13.0  © 5856-1140 Healthwise, Incorporated. Care instructions adapted under license by [employer] (which disclaims liability or warranty for this information). If you have questions about a medical condition or this instruction, always ask your healthcare professional. [place] [address] any warranty or liability for your use of this information.

## 2021-11-09 NOTE — PROGRESS NOTES
[address] [facility]  [email or web address]  [phone]    Roger Tobin MD, [address] [facility]       Annual Gynecologic Exam  WWE 40-60  Chief Complaint   Patient presents with    Tomas Woman       Frida Gracia is a 51 y.o.  BLACK/  female who presents for an annual exam.  Patient's last menstrual period was 10/12/2021. Patient has the following concerns today: pt reports she had fibroid removal surgery in  & has a 'hard spot' around the incision & her wax lady told her about the spot yesterday; denies pain, reports it feels \"weighted\". Hx HSV; uses valtrex PRN. Pt reports her BP was high today d/t smoking a cigarette recently. Pt would like to stop smoking. Menses x 3-5 days. Pt missed MMG but plans to r/s  Uses valtrex prn. Menses stable but still has some cramping. Wants to quit smoking with friend in [facility], works at [employer] so cigarettes are free. Menstrual status:  She does report dysmenorrhea/painful menses. She does not report heavy menses. She does not report irregular bleeding. Sexual history and Contraception:  Social History     Substance and Sexual Activity   Sexual Activity Yes    Partners: Male    Birth control/protection: None       She does not reports new sexual partner(s) in the last year. Preventive Medicine History:  Her most recent Pap smear result: normal was obtained in 2017    Her most recent HR HPV screen was Negative obtained in     She does not have a history of CORINE 2, 3 or cervical cancer. Breast health:  [facility] Results (most recent):  Results from [place] encounter on 10/14/20    [facility] MAMMO LT DX INCL CAD    Narrative  STUDY:  Left Diagnostic Digital Mammography    INDICATION:  Screening call back for left breast calcifications    VIEWS OBTAINED: Left magnification CC, ML, full ML    COMPARISON:  , ,     BREAST COMPOSITION: The breast tissue is extremely dense.  This may lower the  sensitivity of mammography. FINDINGS: Left digital diagnostic mammography was performed, and is interpreted  in conjunction with a computer assisted detection (CAD) system. Further  evaluation was performed for left breast calcifications. In the left superior  breast at far posterior depth are 2 groups of milk of calcium calcifications. No  suspicious findings in the left breast.    Impression  IMPRESSION:    1. BI-RADS Assessment Category 2: Benign finding. Milk of calcium calcifications  are benign. Recommendations:  Continue annual screening mammography. The patient has been notified of these results and recommendations. Last mammogram: approximate date 10/2020 and was normal.   Breast cancer family updated: see FH. Past Medical History:   Diagnosis Date    Abnormal mammogram 10/06/2020    BI-RADS 0: Incomplete. Needs additional imaging evaluation. 2 groups of left breast calcifications    Abnormal mammogram of left breast 10/14/2020    BI-RADS Assessment Category 2: Benign finding. Milk of calcium calcifications are benign    Breast mass     left    Dysplasia of cervix, low grade (CORINE 1) 3/31/10    ecto    Genital HSV     Hx of mammogram 01/15/2019    Normal    Hx of mammogram 14    need addt'l views    Pap smear for cervical cancer screening 12; /    negative, HPV negative; negative, HPV negative     OB History    Para Term  AB Living   2 1 1 0 1 1   SAB IAB Ectopic Molar Multiple Live Births   0 1 0   0        # Outcome Date GA Lbr Mike/2nd Weight Sex Delivery Anes PTL Lv   2 IAB            1 Term               Obstetric Comments   Menarche:  14. LMP: 2/23/15. # of Children:  1. Age at Delivery of First Child:  24.   Hysterectomy/oophorectomy:  NO/NO. Breast Bx:  no.  Hx of Breast Feeding:  no.   BCP:  no. Hormone therapy:  no.        Past Surgical History:   Procedure Laterality Date    HX BREAST BIOPSY Left 2015    LEFT BREAST EXCISIONAL BIOPSY WITH ULTRASOUND performed by Joy Barrientos MD at [address] HX COLPOSCOPY  3/31/10    CIN1    HX GYN  1998    cyst removal    HX GYN  2002    fibroid removed: laparotomy     Family History   Problem Relation Age of Onset    No Known Problems Mother     No Known Problems Father     No Known Problems Brother     No Known Problems Brother     No Known Problems Daughter      Social History     Socioeconomic History    Marital status:      Spouse name: Not on file    Number of children: Not on file    Years of education: Not on file    Highest education level: Not on file   Occupational History    Not on file   Tobacco Use    Smoking status: Current Every Day Smoker     Packs/day: 0.25     Years: 6.00     Pack years: 1.50     Types: Cigarettes    Smokeless tobacco: Never Used    Tobacco comment: Never used vapor or e-cigs    Vaping Use    Vaping Use: Never used   Substance and Sexual Activity    Alcohol use: Yes     Alcohol/week: 1.0 standard drink     Types: 1 Standard drinks or equivalent per week    Drug use: No    Sexual activity: Yes     Partners: Male     Birth control/protection: None   Other Topics Concern    Not on file   Social History Narrative    Not on file     Social Determinants of Health     Financial Resource Strain:     Difficulty of Paying Living Expenses: Not on file   Food Insecurity:     Worried About Running Out of Food in the Last Year: Not on file    Edin of Food in the Last Year: Not on file   Transportation Needs:     Lack of Transportation (Medical): Not on file    Lack of Transportation (Non-Medical):  Not on file   Physical Activity:     Days of Exercise per Week: Not on file    Minutes of Exercise per Session: Not on file   Stress:     Feeling of Stress : Not on file   Social Connections:     Frequency of Communication with Friends and Family: Not on file    Frequency of Social Gatherings with Friends and Family: Not on file    Attends Buddhism Services: Not on file    Active Member of Clubs or Organizations: Not on file    Attends Club or Organization Meetings: Not on file    Marital Status: Not on file   Intimate Partner Violence:     Fear of Current or Ex-Partner: Not on file    Emotionally Abused: Not on file    Physically Abused: Not on file    Sexually Abused: Not on file   Housing Stability:     Unable to Pay for Housing in the Last Year: Not on file    Number of [place] in the Last Year: Not on file    Unstable Housing in the Last Year: Not on file       Allergies   Allergen Reactions    Diflucan [Fluconazole] Swelling     \"swollen face\"       Current Outpatient Medications   Medication Sig    valACYclovir (VALTREX) 500 mg tablet Please take one tab by mouth 2 times a day for 3 days.  nystatin-triamcinolone (MYCOLOG) 100,000-0.1 unit/gram-% ointment Apply  to affected area two (2) times a day for 7 days. For axilla    ascorbic acid, vitamin C, (Vitamin C) 250 mg tablet Take  by mouth.  aspirin/salicylamide/caffeine (BC HEADACHE POWDER PO) Take  by mouth.  HYDROcodone-acetaminophen (NORCO) 7.5-325 mg per tablet Take 1 Tab by mouth every four (4) hours as needed for Pain. Max Daily Amount: 6 Tabs. (Patient not taking: Reported on 11/9/2021)    HYDROcodone-acetaminophen (NORCO) 7.5-325 mg per tablet Take 1 Tab by mouth every four (4) hours as needed for Pain. Max Daily Amount: 6 Tabs. (Patient not taking: Reported on 11/9/2021)    multivitamin (ONE A DAY) tablet Take 1 tablet by mouth daily. (Patient not taking: Reported on 11/9/2021)    BIOTIN PO Take  by mouth. No current facility-administered medications for this visit. Patient Active Problem List   Diagnosis Code    Depression F32. A    Major depressive disorder, recurrent episode, severe, without mention of psychotic behavior F33.2    Borderline personality disorder ([facility] [address] F60.3    Atypical hyperplasia of left breast N60.92       Review of Systems - History obtained from the patient  Constitutional/general, HEENT, CV, Resp, GI, MSK, Neuro, Psych, Heme/lymph, Skin, Breast ROS: no significant complaints except as noted on HPI     Physical Exam  Visit Vitals  BP (!) 147/97   Pulse 80   Ht 5' 6\" (1.676 m)   Wt 114 lb 12.8 oz (52.1 kg)   LMP 10/12/2021   BMI 18.53 kg/m²       Constitutional  · Appearance: well-nourished, well developed, alert, in no acute distress    HENT  · Head and Face: appears normal    Neck  · Inspection/Palpation: normal appearance, no masses or tenderness  · Lymph Nodes: no lymphadenopathy present  · Thyroid: gland size normal, nontender, no nodules or masses present on palpation    Chest  · Respiratory Effort: breathing unlabored  · Auscultation: normal breath sounds    Cardiovascular  · Heart:  · Auscultation: regular rate and rhythm without murmur    Breasts  · Inspection of Breasts: breasts symmetrical, no skin changes, no discharge present, nipple appearance normal, no skin retraction present  · Palpation of Breasts and Axillae: no masses present on palpation, no breast tenderness  · Axillary Lymph Nodes: no lymphadenopathy present    Gastrointestinal  · Abdominal Examination: abdomen non-tender to palpation, normal bowel sounds, no masses present  · Liver and spleen: no hepatomegaly present, spleen not palpable  · Hernias: no hernias identified    Genitourinary  · External Genitalia: normal appearance for age, no discharge present, no tenderness present, no inflammatory lesions present, no masses present, without atrophy present  · Vagina: normal vaginal vault without central or paravaginal defects, no discharge present, no inflammatory lesions present, no masses present  · Bladder: non-tender to palpation  · Urethra: appears normal  · Cervix: normal   · Uterus: enlarged size 14-16, shape and consistency  · Adnexa: no adnexal tenderness present, no adnexal masses present  · Perineum: perineum within normal limits, no evidence of trauma, no rashes or skin lesions present  · Anus: anus within normal limits, no hemorrhoids present  · Inguinal Lymph Nodes: no lymphadenopathy present    Skin  · General Inspection: no rash, no lesions identified    Neurologic/Psychiatric  · Mental Status:  · Orientation: grossly oriented to person, place and time  · Mood and Affect: mood normal, affect appropriate    Assessment:  51 y.o.  for well woman exam  Encounter Diagnoses   Name Primary?  Encounter for Papanicolaou smear for cervical cancer screening Yes    Encounter for well woman exam with routine gynecological exam     Vaginal lesion     Exposure to sexually transmitted disease (STD)     Rash     Tobacco use     Encounter for tobacco use cessation counseling     Pelvic mass        Plan:  The patient was counseled about healthy lifestyle, disease prevention, and bone protection. We discussed current self breast exam and mammogram recommendations  We discussed current pap smear and HR HPV testing guidelines  Continue valtrex prn  I recommended follow up in one year for routine annual gynecologic exam or sooner if needed  I recommended follow up with a primary care physician for chronic medical problems and evaluation of non-gynecologic concerns and to please contact our office with any GYN questions or concerns. Disc good shaving habits: topical cream, notify MD if NI      Disc tobacco cessation. FU and US  Disc typical course of myomas    On this date, 2021,  I have spent 15 minutes reviewing previous notes, test results and face to face with the patient discussing the diagnosis and importance of compliance with the treatment plan as well as documenting on the day of the visit. 2    Dx: new problem to this provider that is moderate, data reviewed: last US not in chart`;  work up planned: US and tobacco cessation, intervention: tobacco cessation program, resources given             Folllow up:  [x] return for annual well woman exam in one year or sooner if she is having problems  [] follow up and ultrasound  [x] mammogram  [] 6 months  [] 3 months  [] 1 month    Orders Placed This Encounter    valACYclovir (VALTREX) 500 mg tablet    nystatin-triamcinolone (MYCOLOG) 100,000-0.1 unit/gram-% ointment    PAP IG, CT-NG, HPV RFX HPV 16/18,45       No results found for any visits on 11/09/21.

## 2021-11-13 LAB
C TRACH RRNA CVX QL NAA+PROBE: NEGATIVE
CYTOLOGIST CVX/VAG CYTO: NORMAL
CYTOLOGY CVX/VAG DOC CYTO: NORMAL
CYTOLOGY CVX/VAG DOC THIN PREP: NORMAL
CYTOLOGY HISTORY:: NORMAL
DX ICD CODE: NORMAL
HPV I/H RISK 4 DNA CVX QL PROBE+SIG AMP: NEGATIVE
Lab: NORMAL
N GONORRHOEA RRNA CVX QL NAA+PROBE: NEGATIVE
OTHER STN SPEC: NORMAL
STAT OF ADQ CVX/VAG CYTO-IMP: NORMAL

## 2021-11-15 NOTE — PROGRESS NOTES
Normal pap smear, message sent if [facility] active. Update PMH/: include: Date of pap, Cytology: wnl. For HR HPV results: list NEG or POS, when done.

## 2021-11-23 ENCOUNTER — OFFICE VISIT (OUTPATIENT)
Dept: OBGYN CLINIC | Age: 46
End: 2021-11-23

## 2021-11-23 VITALS
SYSTOLIC BLOOD PRESSURE: 145 MMHG | BODY MASS INDEX: 23.78 KG/M2 | DIASTOLIC BLOOD PRESSURE: 90 MMHG | WEIGHT: 148 LBS | HEART RATE: 78 BPM | HEIGHT: 66 IN

## 2021-11-23 DIAGNOSIS — R03.0 ELEVATED BLOOD PRESSURE READING: ICD-10-CM

## 2021-11-23 DIAGNOSIS — Z72.0 TOBACCO USE: ICD-10-CM

## 2021-11-23 DIAGNOSIS — N92.4 EXCESSIVE BLEEDING IN PREMENOPAUSAL PERIOD: ICD-10-CM

## 2021-11-23 DIAGNOSIS — N89.8 VAGINAL ODOR: ICD-10-CM

## 2021-11-23 DIAGNOSIS — D21.9 MYOMA: Primary | ICD-10-CM

## 2021-11-23 DIAGNOSIS — N89.8 VAGINAL DISCHARGE: ICD-10-CM

## 2021-11-23 PROCEDURE — 99213 OFFICE O/P EST LOW 20 MIN: CPT | Performed by: OBSTETRICS & GYNECOLOGY

## 2021-11-23 NOTE — PROGRESS NOTES
[facility]  [email or web address]    Gordon Shepard MD, [address] [facility]       OB/GYN Follow-up visit    Chief Complaint: Follow up visit  Chief Complaint   Patient presents with    Follow-up    Ultrasound    Vaginal Discharge     odor       Ultrasound:  TRANSVAGINAL ULTRASOUND PERFORMED  UTERUS IS RETROVERTED, ENLARGED IN SIZE AND HETEROGENEOUS IN ECHOGENICITY. MULTIPLE FIBROIDS ARE SEEN. THE THREE LARGEST FIBROIDS ARE MEASURED. FIBROID 1, ANTERIOR LEFT  FIBROID 2, LEFT FUNDAL  FIBROID 3, LEFT FUNDAL PEDUNCULATED  ENDOMETRIUM MEASURES 9-10MM IN THICKNESS. NO EVIDENCE OF MASSES OR ABNORMALITIES ARE SEEN. RIGHT OVARY APPEARS WITHIN NORMAL LIMITS. A FOLLICULAR CYST IS SEEN. LEFT OVARY APPEARS WITHIN NORMAL LIMITS. FREE FLUID SEEN IN THE CDS. History of Present Illness: This is a follow up visit from 11/09/2021. She is having a follow up for enlarged uterus & vaginal leison. Since her last cycle she has had vaginal discharge with odor; she thinks she has BV (hx of BV). Trying to quit smoking, quit date 12/1    Heavy menses last month but not typical.     She reports the symptoms are is unchanged. Aggravating factors include none. Alleviating factors include none. She does not have other concerns. LMP: Patient's last menstrual period was 11/09/2021. PFSH:  Past Medical History:   Diagnosis Date    Abnormal mammogram 10/06/2020    BI-RADS 0: Incomplete. Needs additional imaging evaluation. 2 groups of left breast calcifications    Abnormal mammogram of left breast 10/14/2020    BI-RADS Assessment Category 2: Benign finding.  Milk of calcium calcifications are benign    Breast mass     left    Dysplasia of cervix, low grade (CORINE 1) 3/31/10    ecto    Genital HSV     Hx of mammogram 01/15/2019/ 11/12/21    Normal; normal/dense    Hx of mammogram 02/09/14    need addt'l views    Pap smear for cervical cancer screening 9/14/12; 11/16/17; 11/9/2021    negative, HPV negative; negative, HPV negative; negative HPVneg     Past Surgical History:   Procedure Laterality Date    HX BREAST BIOPSY Left 4/2/2015    LEFT BREAST EXCISIONAL BIOPSY WITH ULTRASOUND performed by Claudia De Jesus MD at [address] HX COLPOSCOPY  3/31/10    CIN1    HX GYN  1998    cyst removal    HX GYN  2002    fibroid removed: laparotomy     Family History   Problem Relation Age of Onset    No Known Problems Mother     No Known Problems Father     No Known Problems Brother     No Known Problems Brother     No Known Problems Daughter      Social History     Tobacco Use    Smoking status: Current Every Day Smoker     Packs/day: 0.25     Years: 6.00     Pack years: 1.50     Types: Cigarettes    Smokeless tobacco: Never Used    Tobacco comment: Never used vapor or e-cigs    Vaping Use    Vaping Use: Never used   Substance Use Topics    Alcohol use: Yes     Alcohol/week: 1.0 standard drink     Types: 1 Standard drinks or equivalent per week    Drug use: No     Allergies   Allergen Reactions    Diflucan [Fluconazole] Swelling     \"swollen face\"     Current Outpatient Medications   Medication Sig    valACYclovir (VALTREX) 500 mg tablet Please take one tab by mouth 2 times a day for 3 days. No current facility-administered medications for this visit.        Review of Systems:  History obtained from the patient  Constitutional: negative for fevers, chills and weight loss  ENT ROS: negative for - hearing change, oral lesions or visual changes  Respiratory: negative for cough, wheezing or dyspnea on exertion  Cardiovascular: negative for chest pain, irregular heart beats, exertional chest pressure/discomfort  Gastrointestinal: negative for dysphagia, nausea and vomiting  Genito-Urinary ROS: no dysuria, trouble voiding, or hematuria   Inteument/breast: negative for rash, breast lump and nipple discharge  Musculoskeletal:negative for stiff joints, neck pain and muscle weakness  Endocrine ROS: negative for - breast changes, galactorrhea or temperature intolerance  Hematological and Lymphatic ROS: negative for - blood clots, bruising or swollen lymph nodes    Physical Exam:  Visit Vitals  BP (!) 145/90   Pulse 78   Ht 5' 6\" (1.676 m)   Wt 148 lb (67.1 kg)   BMI 23.89 kg/m²       GENERAL: alert, well appearing, and in no distress  ABDOMEN: soft, nontender, nondistended, no masses or organomegaly   EGBUS: no lesions, no inflammation, no masses  VULVA: normal appearing vulva with no masses, tenderness or lesions  VAGINA: normal appearing vagina with normal color, no lesions, white discharge  CERVIX: normal appearing cervix without discharge or lesions, non tender  UTERUS: uterus is enlarged nodular size, shape, consistency and nontender   ADNEXA: normal adnexa in size, nontender and no masses  NEURO: alert, oriented, normal speech    Assessment:  Encounter Diagnoses   Name Primary?  Vaginal discharge     Vaginal odor     Tobacco use     Myoma Yes    Excessive bleeding in premenopausal period     Elevated blood pressure reading        Plan:  The patient is advised that she should contact the office with any questions or concerns. She should make her routine annual gynecologic appointment if needed. We discussed potential causes of vaginal discharge/irritation. We discussed good vulvar hygiene. Recommended avoid vaginal irritants. Discussed use of mild soaps/detergents. Follow up if NI. Patient will be notified about swab results and prescription sent, if indicated.    Disc options for myomas/ heavy bleeding   Disc typical course of myomas and management options: observe/nsaids/oriahnn (rec bp improved and quit smoking) vs surgery vs [place]    rec PCP fu for chantix rx      On this date, 11/23/2021,  I have spent 25 minutes reviewing previous notes, test results and face to face with the patient discussing the diagnosis and importance of compliance with the treatment plan as well as documenting on the day of the visit. Orders Placed This Encounter    NUSWAB VAGINITIS PLUS       No results found for this visit on 11/23/21. Aditya Owen MD    Physician review of ultrasound performed by technician    Today's ultrasound report and images were reviewed and discussed with the patient.   Please see images and imaging report entered by technician in PACS for more detail and progress note and diagnosis entered by MD.    Vinod Cruz MD

## 2021-11-28 RX ORDER — METRONIDAZOLE 500 MG/1
500 TABLET ORAL 2 TIMES DAILY
Qty: 14 TABLET | Refills: 0 | Status: SHIPPED | OUTPATIENT
Start: 2021-11-28 | End: 2021-12-05

## 2021-11-28 NOTE — PROGRESS NOTES
Abnormal, notify patient. Notify pt if [address] message not read or not active. Borderline for BV. Pt can treat or observe.    Rx:   flagyl 500mg bid   x 7 days    May cause nausea, take with food  Avoid etoh during treatment and 1 day following  Notify MD if NI after 1 week    (If patient prefers vaginal tx't  0.75 %t metronidazole vaginal gel   5 grams qhs per vagina  x5 days)

## 2021-11-29 NOTE — PROGRESS NOTES
Written by Bassem Etienne MD on 11/28/2021  6:02 PM EST View Full Comments  Seen by patient Adriana Gant on 11/28/2021  6:02 PM

## 2022-01-21 ENCOUNTER — HOSPITAL ENCOUNTER (EMERGENCY)
Age: 47
Discharge: HOME | End: 2022-01-21
Attending: STUDENT IN AN ORGANIZED HEALTH CARE EDUCATION/TRAINING PROGRAM
Payer: COMMERCIAL

## 2022-01-21 ENCOUNTER — TELEPHONE (OUTPATIENT)
Dept: OBGYN CLINIC | Age: 47
End: 2022-01-21

## 2022-01-21 ENCOUNTER — APPOINTMENT (OUTPATIENT)
Dept: ULTRASOUND IMAGING | Age: 47
End: 2022-01-21
Attending: NURSE PRACTITIONER
Payer: COMMERCIAL

## 2022-01-21 VITALS
HEIGHT: 66 IN | OXYGEN SATURATION: 98 % | WEIGHT: 152 LBS | DIASTOLIC BLOOD PRESSURE: 91 MMHG | TEMPERATURE: 97.9 F | HEART RATE: 84 BPM | SYSTOLIC BLOOD PRESSURE: 163 MMHG | RESPIRATION RATE: 18 BRPM | BODY MASS INDEX: 24.43 KG/M2

## 2022-01-21 DIAGNOSIS — R10.2 PELVIC PAIN IN FEMALE: ICD-10-CM

## 2022-01-21 DIAGNOSIS — N83.202 CYST OF LEFT OVARY: Primary | ICD-10-CM

## 2022-01-21 DIAGNOSIS — R10.32 ABDOMINAL PAIN, LLQ (LEFT LOWER QUADRANT): ICD-10-CM

## 2022-01-21 LAB
ALBUMIN SERPL-MCNC: 3.6 G/DL (ref 3.5–5)
ALBUMIN/GLOB SERPL: 0.9 {RATIO} (ref 1.1–2.2)
ALP SERPL-CCNC: 67 U/L (ref 45–117)
ALT SERPL-CCNC: 14 U/L (ref 12–78)
ANION GAP SERPL CALC-SCNC: 5 MMOL/L (ref 5–15)
APPEARANCE UR: CLEAR
AST SERPL-CCNC: 8 U/L (ref 15–37)
BACTERIA URNS QL MICRO: NEGATIVE /HPF
BASOPHILS # BLD: 0.1 K/UL (ref 0–0.1)
BASOPHILS NFR BLD: 1 % (ref 0–1)
BILIRUB SERPL-MCNC: 0.5 MG/DL (ref 0.2–1)
BILIRUB UR QL: NEGATIVE
BUN SERPL-MCNC: 19 MG/DL (ref 6–20)
BUN/CREAT SERPL: 18 (ref 12–20)
CALCIUM SERPL-MCNC: 9.3 MG/DL (ref 8.5–10.1)
CHLORIDE SERPL-SCNC: 108 MMOL/L (ref 97–108)
CO2 SERPL-SCNC: 24 MMOL/L (ref 21–32)
COLOR UR: ABNORMAL
COMMENT, HOLDF: NORMAL
COMMENT, HOLDF: NORMAL
CREAT SERPL-MCNC: 1.03 MG/DL (ref 0.55–1.02)
DIFFERENTIAL METHOD BLD: NORMAL
EOSINOPHIL # BLD: 0.2 K/UL (ref 0–0.4)
EOSINOPHIL NFR BLD: 2 % (ref 0–7)
EPITH CASTS URNS QL MICRO: ABNORMAL /LPF
ERYTHROCYTE [DISTWIDTH] IN BLOOD BY AUTOMATED COUNT: 13.8 % (ref 11.5–14.5)
GLOBULIN SER CALC-MCNC: 4 G/DL (ref 2–4)
GLUCOSE SERPL-MCNC: 112 MG/DL (ref 65–100)
GLUCOSE UR STRIP.AUTO-MCNC: NEGATIVE MG/DL
HCG UR QL: NEGATIVE
HCT VFR BLD AUTO: 40.2 % (ref 35–47)
HGB BLD-MCNC: 13.1 G/DL (ref 11.5–16)
HGB UR QL STRIP: NEGATIVE
IMM GRANULOCYTES # BLD AUTO: 0 K/UL (ref 0–0.04)
IMM GRANULOCYTES NFR BLD AUTO: 0 % (ref 0–0.5)
KETONES UR QL STRIP.AUTO: NEGATIVE MG/DL
LEUKOCYTE ESTERASE UR QL STRIP.AUTO: NEGATIVE
LIPASE SERPL-CCNC: 154 U/L (ref 73–393)
LYMPHOCYTES # BLD: 2.4 K/UL (ref 0.8–3.5)
LYMPHOCYTES NFR BLD: 23 % (ref 12–49)
MCH RBC QN AUTO: 29.9 PG (ref 26–34)
MCHC RBC AUTO-ENTMCNC: 32.6 G/DL (ref 30–36.5)
MCV RBC AUTO: 91.8 FL (ref 80–99)
MONOCYTES # BLD: 0.6 K/UL (ref 0–1)
MONOCYTES NFR BLD: 6 % (ref 5–13)
NEUTS SEG # BLD: 7 K/UL (ref 1.8–8)
NEUTS SEG NFR BLD: 68 % (ref 32–75)
NITRITE UR QL STRIP.AUTO: NEGATIVE
NRBC # BLD: 0 K/UL (ref 0–0.01)
NRBC BLD-RTO: 0 PER 100 WBC
PH UR STRIP: 6 [PH] (ref 5–8)
PLATELET # BLD AUTO: 323 K/UL (ref 150–400)
PMV BLD AUTO: 9.9 FL (ref 8.9–12.9)
POTASSIUM SERPL-SCNC: 4.3 MMOL/L (ref 3.5–5.1)
PROT SERPL-MCNC: 7.6 G/DL (ref 6.4–8.2)
PROT UR STRIP-MCNC: NEGATIVE MG/DL
RBC # BLD AUTO: 4.38 M/UL (ref 3.8–5.2)
RBC #/AREA URNS HPF: ABNORMAL /HPF (ref 0–5)
SAMPLES BEING HELD,HOLD: NORMAL
SAMPLES BEING HELD,HOLD: NORMAL
SODIUM SERPL-SCNC: 137 MMOL/L (ref 136–145)
SP GR UR REFRACTOMETRY: >1.03 (ref 1–1.03)
UROBILINOGEN UR QL STRIP.AUTO: 1 EU/DL (ref 0.2–1)
WBC # BLD AUTO: 10.4 K/UL (ref 3.6–11)
WBC URNS QL MICRO: ABNORMAL /HPF (ref 0–4)

## 2022-01-21 PROCEDURE — 81025 URINE PREGNANCY TEST: CPT

## 2022-01-21 PROCEDURE — 85025 COMPLETE CBC W/AUTO DIFF WBC: CPT

## 2022-01-21 PROCEDURE — 36415 COLL VENOUS BLD VENIPUNCTURE: CPT

## 2022-01-21 PROCEDURE — 81001 URINALYSIS AUTO W/SCOPE: CPT

## 2022-01-21 PROCEDURE — 96372 THER/PROPH/DIAG INJ SC/IM: CPT

## 2022-01-21 PROCEDURE — 76830 TRANSVAGINAL US NON-OB: CPT

## 2022-01-21 PROCEDURE — 99284 EMERGENCY DEPT VISIT MOD MDM: CPT

## 2022-01-21 PROCEDURE — 87591 N.GONORRHOEAE DNA AMP PROB: CPT

## 2022-01-21 PROCEDURE — 83690 ASSAY OF LIPASE: CPT

## 2022-01-21 PROCEDURE — 99283 EMERGENCY DEPT VISIT LOW MDM: CPT | Performed by: OBSTETRICS & GYNECOLOGY

## 2022-01-21 PROCEDURE — 80053 COMPREHEN METABOLIC PANEL: CPT

## 2022-01-21 PROCEDURE — 74011250636 HC RX REV CODE- 250/636: Performed by: NURSE PRACTITIONER

## 2022-01-21 RX ORDER — HYDROCODONE BITARTRATE AND ACETAMINOPHEN 5; 325 MG/1; MG/1
1 TABLET ORAL
Qty: 10 TABLET | Refills: 0 | Status: SHIPPED | OUTPATIENT
Start: 2022-01-21 | End: 2022-01-26 | Stop reason: SDUPTHER

## 2022-01-21 RX ORDER — MORPHINE SULFATE 4 MG/ML
4 INJECTION INTRAVENOUS ONCE
Status: COMPLETED | OUTPATIENT
Start: 2022-01-21 | End: 2022-01-21

## 2022-01-21 RX ADMIN — MORPHINE SULFATE 4 MG: 4 INJECTION, SOLUTION INTRAMUSCULAR; INTRAVENOUS at 15:19

## 2022-01-21 NOTE — TELEPHONE ENCOUNTER
Ok, known ho fibroids. Defer to ER work, but rec GYN fu to discuss options again for fibroids/check BP  Is wwe up to date?     Barbara Ring MD

## 2022-01-21 NOTE — ED TRIAGE NOTES
Pt to ER with c/o left flank pain since yesterday. Pt was seen at [facility] ED yesterday and dx with a cyst. Pt reports continued pain.

## 2022-01-21 NOTE — TELEPHONE ENCOUNTER
Call received at 530am    51year old patient last seen in the office on 11/23/2021      Patient calling to say that she went to  [facility] yesterday to [facility]      Patient reports she had the CT scan at [facility] and was advised of need to have the records sent to our office     Patient was provided the office fax number      Patient states she has had recent ultrasound 11/23/2021 and the er wanted to do another vaginal ultrasound and patient declined due to having one done as noted above    Patient reports the side pain is at 12 on the pain scale of 1-10 and states she was prescribed naproxen and that has never worked for her and she has not filled it yet      ?  Ultrasound and ov    Patient was advised due to her pain level that if she can not tolerate to go back to the er      Please advise    Thank you

## 2022-01-21 NOTE — ED PROVIDER NOTES
This is a 39-year-old female who presents ambulatory to the emergency room with complaints of left lower quadrant abdominal pain. Patient was seen at [facility] last night and was diagnosed with a left-sided ovarian cyst.  States the cyst was 5.5 cm in size. Is a patient of Dr. Aguila Rhoades and was told to come to the emergency room with worsening pain because they were \"unable to see her in the office. \"  Patient is denying any chest pain, shortness of breath, dizziness, nausea or vomiting, fevers or chills. Positive urinary urgency and frequency but states \"I drink a lot of water. \"  Denies any hematuria. Denies any difficulty stooling. No vaginal bleeding or discharge. No chance of pregnancy. Last bowel movement was today. Has increasing pain prompting an emergency room visit. Has taken Advil times four prior to arrival for her symptoms with no relief. There are no further complaints at this time. None  Past Medical History:  10/06/2020: Abnormal mammogram      Comment:  BI-RADS 0: Incomplete. Needs additional imaging                evaluation. 2 groups of left breast calcifications  10/14/2020: Abnormal mammogram of left breast      Comment:  BI-RADS Assessment Category 2: Benign finding. 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  01/15/2019/ 11/12/21: Hx of mammogram      Comment:  Normal; normal/dense  02/09/14: Hx of mammogram      Comment:  need addt'l views  9/14/12; 11/16/17; 11/9/2021: Pap smear for cervical cancer screening      Comment:  negative, HPV negative; negative, HPV negative; negative               HPVneg  Past Surgical History:  4/2/2015: HX BREAST BIOPSY;  Left      Comment:  LEFT BREAST EXCISIONAL BIOPSY WITH ULTRASOUND performed                by Marilia Biggs MD at [place] [address]  3/31/10: HX COLPOSCOPY      Comment:  CIN1  1998: HX GYN      Comment:  cyst removal  2002: HX GYN      Comment:  fibroid removed: laparotomy             Past Medical History:   Diagnosis Date    Abnormal mammogram 10/06/2020    BI-RADS 0: Incomplete. Needs additional imaging evaluation. 2 groups of left breast calcifications    Abnormal mammogram of left breast 10/14/2020    BI-RADS Assessment Category 2: Benign finding. Milk of calcium calcifications are benign    Breast mass     left    Dysplasia of cervix, low grade (CORINE 1) 3/31/10    ecto    Genital HSV     Hx of mammogram 01/15/2019/ 11/12/21    Normal; normal/dense    Hx of mammogram 02/09/14    need addt'l views    Pap smear for cervical cancer screening 9/14/12; 11/16/17; 11/9/2021    negative, HPV negative; negative, HPV negative; negative HPVneg       Past Surgical History:   Procedure Laterality Date    HX BREAST BIOPSY Left 4/2/2015    LEFT BREAST EXCISIONAL BIOPSY WITH ULTRASOUND performed by Laci Medellin MD at [address] HX COLPOSCOPY  3/31/10    CIN1    HX GYN  1998    cyst removal    HX GYN  2002    fibroid removed: laparotomy         Family History:   Problem Relation Age of Onset    No Known Problems Mother     No Known Problems Father     No Known Problems Brother     No Known Problems Brother     No Known Problems Daughter        Social History     Socioeconomic History    Marital status:      Spouse name: Not on file    Number of children: Not on file    Years of education: Not on file    Highest education level: Not on file   Occupational History    Not on file   Tobacco Use    Smoking status: Current Every Day Smoker     Packs/day: 0.25     Years: 6.00     Pack years: 1.50     Types: Cigarettes    Smokeless tobacco: Never Used    Tobacco comment: Never used vapor or e-cigs    Vaping Use    Vaping Use: Never used   Substance and Sexual Activity    Alcohol use:  Yes     Alcohol/week: 1.0 standard drink     Types: 1 Standard drinks or equivalent per week    Drug use: No    Sexual activity: Yes     Partners: Male     Birth control/protection: None   Other Topics Concern    Not on file   Social History Narrative    Not on file     Social Determinants of Health     Financial Resource Strain:     Difficulty of Paying Living Expenses: Not on file   Food Insecurity:     Worried About Running Out of Food in the Last Year: Not on file    Edin of Food in the Last Year: Not on file   Transportation Needs:     Lack of Transportation (Medical): Not on file    Lack of Transportation (Non-Medical): Not on file   Physical Activity:     Days of Exercise per Week: Not on file    Minutes of Exercise per Session: Not on file   Stress:     Feeling of Stress : Not on file   Social Connections:     Frequency of Communication with Friends and Family: Not on file    Frequency of Social Gatherings with Friends and Family: Not on file    Attends Mandaen Services: Not on file    Active Member of [address] [place] [address] or Organizations: Not on file    Attends Club or Organization Meetings: Not on file    Marital Status: Not on file   Intimate Partner Violence:     Fear of Current or Ex-Partner: Not on file    Emotionally Abused: Not on file    Physically Abused: Not on file    Sexually Abused: Not on file   Housing Stability:     Unable to Pay for Housing in the Last Year: Not on file    Number of [place] in the Last Year: Not on file    Unstable Housing in the Last Year: Not on file         ALLERGIES: Diflucan [fluconazole]    Review of Systems   Constitutional: Negative for appetite change, chills, diaphoresis, fatigue and fever. HENT: Negative for congestion, ear discharge, ear pain, sinus pressure, sinus pain, sore throat and trouble swallowing. Eyes: Negative for photophobia, pain, redness and visual disturbance. Respiratory: Negative for chest tightness, shortness of breath and wheezing. Cardiovascular: Negative for chest pain and palpitations. Gastrointestinal: Positive for abdominal pain. Negative for abdominal distention, nausea and vomiting. Endocrine: Negative. Genitourinary: Negative for difficulty urinating, flank pain, frequency and urgency. Musculoskeletal: Negative for back pain, neck pain and neck stiffness. Skin: Negative for color change, pallor, rash and wound. Allergic/Immunologic: Negative. Neurological: Negative for dizziness, speech difficulty, weakness and headaches. Hematological: Does not bruise/bleed easily. Psychiatric/Behavioral: Negative for behavioral problems. The patient is not nervous/anxious. Vitals:    01/21/22 1155   BP: (!) 135/93   Pulse: 74   Resp: 16   Temp: 98.4 °F (36.9 °C)   SpO2: 100%   Weight: 68.9 kg (152 lb)   Height: 5' 6\" (1.676 m)            Physical Exam  Vitals and nursing note reviewed. Constitutional:       General: She is not in acute distress. Appearance: Normal appearance. She is well-developed. She is not ill-appearing. HENT:      Head: Normocephalic and atraumatic. Right Ear: External ear normal.      Left Ear: External ear normal.      Nose: Nose normal. No congestion. Mouth/Throat:      Mouth: Mucous membranes are moist.   Eyes:      General:         Right eye: No discharge. Left eye: No discharge. Conjunctiva/sclera: Conjunctivae normal.      Pupils: Pupils are equal, round, and reactive to light. Neck:      Vascular: No JVD. Trachea: No tracheal deviation. Cardiovascular:      Rate and Rhythm: Normal rate and regular rhythm. Pulses: Normal pulses. Heart sounds: Normal heart sounds. No murmur heard. No gallop. Pulmonary:      Effort: Pulmonary effort is normal. No respiratory distress. Breath sounds: Normal breath sounds. No wheezing or rales. Chest:      Chest wall: No tenderness. Abdominal:      General: Bowel sounds are normal. There is no distension. Palpations: Abdomen is soft. Tenderness:  There is abdominal tenderness in the left lower quadrant. There is no guarding or rebound. Genitourinary:     Comments: Negative    Musculoskeletal:         General: No tenderness. Normal range of motion. Cervical back: Normal range of motion and neck supple. Skin:     General: Skin is warm and dry. Capillary Refill: Capillary refill takes less than 2 seconds. Coloration: Skin is not pale. Findings: No erythema or rash. Neurological:      General: No focal deficit present. Mental Status: She is alert and oriented to person, place, and time. Motor: No weakness. Coordination: Coordination normal.   Psychiatric:         Mood and Affect: Mood normal.         Behavior: Behavior normal.         Thought Content: Thought content normal.         Judgment: Judgment normal.          MDM  Number of Diagnoses or Management Options  Abdominal pain, LLQ (left lower quadrant): new and requires workup  Cyst of left ovary: new and requires workup  Diagnosis management comments: Differential diagnosis includes ectopic pregnancy, ovarian cyst,urinary tract infection and others. After physical examination, review of imaging and laboratory data as well as evaluation by Dr. Edgardo Chacon patient was medicated and discharged home and will follow-up with PCP and OB/GYN as an outpatient. Return to the emergency room with worsening symptoms. Patient in agreement with plan of care. Amount and/or Complexity of Data Reviewed  Clinical lab tests: ordered and reviewed  Tests in the radiology section of CPT®: ordered and reviewed         1:29 PM  Spoke with Dr. Edgardo Chacon who will come and see patient.      Labs Reviewed   METABOLIC PANEL, COMPREHENSIVE - Abnormal; Notable for the following components:       Result Value    Glucose 112 (*)     Creatinine 1.03 (*)     GFR est non-AA 57 (*)     AST (SGOT) 8 (*)     A-G Ratio 0.9 (*)     All other components within normal limits   URINALYSIS W/MICROSCOPIC - Abnormal; Notable for the following components:    Specific gravity >1.030 (*)     All other components within normal limits   CBC WITH AUTOMATED DIFF   LIPASE   SAMPLES BEING HELD   SAMPLES BEING HELD   CHLAMYDIA / GC-AMPLIFIED   HCG URINE, QL. - POC     US TRANSVAGINAL    Result Date: 1/21/2022  5.5 cm nonacute hemorrhagic left ovarian cyst with evidence of partial rupture. No torsion at this time. If patient is premenopausal, no requirement for follow-up. If postmenopausal, recommend repeat pelvic ultrasound in 1-2 months.    3:06 PM  Pt has been reexamined. Pt has no new complaints, changes or physical findings. Care plan outlined and precautions discussed. All available results were reviewed with pt. All medications were reviewed with pt. All of pt's questions and concerns were addressed. Pt agrees to F/U as instructed and agrees to return to ED upon further deterioration. Pt is ready to go home. Je Estrada NP    Please note that this dictation was completed with [employer], the computer voice recognition software. Quite often unanticipated grammatical, syntax, homophones, and other interpretive errors are inadvertently transcribed by the computer software. Please disregard these errors. Please excuse any errors that have escaped final proofreading. Thank you.     Procedures

## 2022-01-21 NOTE — H&P
OB GYN consult:   requesting: FRANCESCA  Reason: Pain and ovarian cyst  Gynecology Consult for ER    Name: Lamin Gerber MRN: [id number] [id number]: [id number]    YOB: 1975  Age: 51 y.o. Sex: female       Subjective:      Chief complaint:  Pelvic pain    Abbie Whitten is a 51 y.o. female with a history of LLQ pain. Previous workup included CT at [facility], [address] [facility] at [facility]. Previous treatment measures included US. She presents to the ER complaining of LLQ x 2 days. The pain started yesterday at work, she was seen at an [facility] facility and was diagnosed with an ovarian cyst.  She was given percocet in the ER and took NSAIDs this am without much improvement in pain. Had some hot flashes yesterday with pain and nausea but denies fever /chills/COVID symptoms. She has known fibroids and last period wasn't as bad in terms of heavy bleeding. She reports no new sexual partners. She denies any chance of pregnancy. She is about 12 days away from her next cycle. She denies richard bowel or bladder changes. Mammogram results:  Results from [place] encounter on 11/12/21    SHERRI 3D KAMALA W MAMMO BI SCREENING INCL CAD    Narrative  STUDY: Bilateral digital screening mammogram with 3-D tomosynthesis    INDICATION:  Screening. COMPARISON: 2020 and 2019    BREAST COMPOSITION: The breasts are extremely dense, which lowers the  sensitivity of mammography. FINDINGS: Bilateral digital screening mammography was performed and is  interpreted in conjunction with a computer assisted detection (CAD) system. Additionally, tomosynthesis of both breasts in the CC and MLO projections was  performed. No significant change in the appearance of calcifications in the left  breast. No suspicious masses or calcifications are identified. There has been no  significant change. Impression  BI-RADS 2: Benign. No mammographic evidence of malignancy. RECOMMENDATIONS:  Next screening mammogram is recommended in one year.     The patient will be notified of these results. XR Results (maximum last 3): No results found for this or any previous visit. CT Results (maximum last 3): Results from Abstract encounter on 01/21/22    CT ABD PELV W CONT      MRI Results (maximum last 3): No results found for this or any previous visit. Nuclear Medicine Results (maximum last 3): No results found for this or any previous visit. US Results (maximum last 3): Results from Hospital Encounter encounter on 01/21/22    US TRANSVAGINAL    Narrative  EXAM:  US TRANSVAGINAL    INDICATION: Left pelvic pain. COMPARISON: Pelvic ultrasound on 11/23/2021    TECHNIQUE: Endovaginal ultrasound of the female pelvis. FINDINGS: Transabdominal ultrasound:    Not Performed. Endovaginal ultrasound:    Urinary bladder: Incomplete distention, limited evaluation. Uterus: measures 9.9 x 6.7 x 5.1 cm, which is large. There is no sonographic  myometrial abnormality. Endometrium: 5 mm in thickness. Homogeneous. Right ovary: Obscured by large uterus and bowel gas. No right adnexal mass or  cyst.    Left ovary: 6.2 x 4.8 x 3.4 cm. Blood flow is present in the left ovary. Heterogeneous lacelike cystic lesion in the left ovary measures 5.5 x 4.4 x 3.3  cm. Free fluid: Small volume of fluid. Impression  5.5 cm nonacute hemorrhagic left ovarian cyst with evidence of partial rupture. No torsion at this time. If patient is premenopausal, no requirement for follow-up. If postmenopausal,  recommend repeat pelvic ultrasound in 1-2 months. Results from Appointment encounter on 11/23/21    US TRANSVAGINAL    Narrative  See impression. Impression  Impression: The final result for this exam can be located in this patient's chart with  results from [facility]. DEXA Results (maximum last 3): No results found for this or any previous visit. SHERRI Results (maximum last 3):   Results from [place] encounter on 11/12/21    SHERRI 3D KAMALA W MAMMO BI SCREENING INCL CAD    Narrative  STUDY: Bilateral digital screening mammogram with 3-D tomosynthesis    INDICATION:  Screening. COMPARISON: 2020 and 2019    BREAST COMPOSITION: The breasts are extremely dense, which lowers the  sensitivity of mammography. FINDINGS: Bilateral digital screening mammography was performed and is  interpreted in conjunction with a computer assisted detection (CAD) system. Additionally, tomosynthesis of both breasts in the CC and MLO projections was  performed. No significant change in the appearance of calcifications in the left  breast. No suspicious masses or calcifications are identified. There has been no  significant change. Impression  BI-RADS 2: Benign. No mammographic evidence of malignancy. RECOMMENDATIONS:  Next screening mammogram is recommended in one year. The patient will be notified of these results. Results from [place] encounter on 10/14/20    SHERRI MAMMO LT DX INCL CAD    Narrative  STUDY:  Left Diagnostic Digital Mammography    INDICATION:  Screening call back for left breast calcifications    VIEWS OBTAINED: Left magnification CC, ML, full ML    COMPARISON:  2020, 2019, 2015    BREAST COMPOSITION: The breast tissue is extremely dense. This may lower the  sensitivity of mammography. FINDINGS: Left digital diagnostic mammography was performed, and is interpreted  in conjunction with a computer assisted detection (CAD) system. Further  evaluation was performed for left breast calcifications. In the left superior  breast at far posterior depth are 2 groups of milk of calcium calcifications. No  suspicious findings in the left breast.    Impression  IMPRESSION:    1. BI-RADS Assessment Category 2: Benign finding. Milk of calcium calcifications  are benign. Recommendations:  Continue annual screening mammography. The patient has been notified of these results and recommendations.       Results from [facility] [place] Encounter encounter on 10/06/20    [facility] MAMMO BI SCREENING INCL CAD    Narrative  STUDY: Bilateral digital screening mammogram    INDICATION:  Screening. History of left breast ADH status post excisional  biopsy. COMPARISON: 2015    BREAST COMPOSITION:  The breasts are extremely dense, which lowers the  sensitivity of mammography. FINDINGS: Bilateral digital screening mammography was performed and is  interpreted in conjunction with a computer assisted detection (CAD) system. In  the right breast, no suspicious masses or calcifications are identified. In the  left breast superiorly at 12:00 posterior depth there are indeterminate  calcifications. Further from the nipple on the MLO view only there are  additional microcalcifications at even further posterior depth. Impression  IMPRESSION:  BI-RADS 0: Incomplete. Needs additional imaging evaluation. 2 groups of left  breast calcifications    RECOMMENDATIONS:  Left magnification CC and ML, full ML, left X CCL. The patient will be notified of these results. IR Results (maximum last 3): No results found for this or any previous visit. VAS/US Results (maximum last 3): No results found for this or any previous visit. PET Results (maximum last 3): No results found for this or any previous visit. OB History          2    Para   1    Term   1       0    AB   1    Living   1         SAB   0    IAB   1    Ectopic   0    Molar        Multiple   0    Live Births   1          Obstetric Comments   Menarche:  14. LMP: 2/23/15. # of Children:  1. Age at Delivery of First Child:  24.   Hysterectomy/oophorectomy:  NO/NO. Breast Bx:  no.  Hx of Breast Feeding:  no. BCP:  no. Hormone therapy:  no.              Past Medical History:   Diagnosis Date    Abnormal mammogram 10/06/2020    BI-RADS 0: Incomplete. Needs additional imaging evaluation.  2 groups of left breast calcifications    Abnormal mammogram of left breast 10/14/2020    BI-RADS Assessment Category 2: Benign finding. Milk of calcium calcifications are benign    Breast mass     left    Dysplasia of cervix, low grade (CORINE 1) 3/31/10    ecto    Genital HSV     Hx of mammogram 01/15/2019/ 11/12/21    Normal; normal/dense    Hx of mammogram 02/09/14    need addt'l views    Pap smear for cervical cancer screening 9/14/12; 11/16/17; 11/9/2021    negative, HPV negative; negative, HPV negative; negative HPVneg     Past Surgical History:   Procedure Laterality Date    HX BREAST BIOPSY Left 4/2/2015    LEFT BREAST EXCISIONAL BIOPSY WITH ULTRASOUND performed by Viola Pompa MD at [address] HX COLPOSCOPY  3/31/10    CIN1    HX GYN  1998    cyst removal    HX GYN  2002    fibroid removed: laparotomy     Social History     Occupational History    Not on file   Tobacco Use    Smoking status: Current Every Day Smoker     Packs/day: 0.25     Years: 6.00     Pack years: 1.50     Types: Cigarettes    Smokeless tobacco: Never Used    Tobacco comment: Never used vapor or e-cigs    Vaping Use    Vaping Use: Never used   Substance and Sexual Activity    Alcohol use: Yes     Alcohol/week: 1.0 standard drink     Types: 1 Standard drinks or equivalent per week    Drug use: No    Sexual activity: Yes     Partners: Male     Birth control/protection: None     Family History   Problem Relation Age of Onset    No Known Problems Mother     No Known Problems Father     No Known Problems Brother     No Known Problems Brother     No Known Problems Daughter      There are no active hospital problems to display for this patient. Allergies   Allergen Reactions    Diflucan [Fluconazole] Swelling     \"swollen face\"     Prior to Admission medications    Medication Sig Start Date End Date Taking? Authorizing Provider   HYDROcodone-acetaminophen (NORCO) 5-325 mg per tablet Take 1 Tablet by mouth every four (4) hours as needed for Pain for up to 7 days.  Max Daily Amount: 6 Tablets. 22 Yes Renan Luciano MD   valACYclovir (VALTREX) 500 mg tablet Please take one tab by mouth 2 times a day for 3 days. 21   Renan Luciano MD        Review of Systems:  A comprehensive review of systems was negative except for that written in the History of Present Illness. Objective:     Vitals:    22 1155 22 1320   BP: (!) 135/93 132/89   Pulse: 74 69   Resp: 16 16   Temp: 98.4 °F (36.9 °C) 97.9 °F (36.6 °C)   SpO2: 100% 100%   Weight: 152 lb (68.9 kg)    Height: 5' 6\" (1.676 m)        Physical Exam:  Patient without distress. Heart: Regular rate and rhythm or S1S2 present  Lung: clear to auscultation throughout lung fields, no wheezes, no rales, no rhonchi and normal respiratory effort  Abdomen: soft, no rebound or guarding, tender LLQ. Extremities, lower: no c/t/e bilaterally  Back no cvat  Pelvic exam:  Speculum; scant discharge, gc/chl obtained  BME: enlarged nodular uterus  No Right adnexa and midline tenderness  + fullness on LLQ with tenderness     Lab Results   Component Value Date/Time    WBC 10.4 2022 12:25 PM    RBC 4.38 2022 12:25 PM    HGB 13.1 2022 12:25 PM    HCT 40.2 2022 12:25 PM    PLATELET 273  12:25 PM         Assessment:   51 y.o.   Discussed typical course of benign/physiologic ovarian cyst vs s/sx ovarian carcinoma. Notify MD for bowel changes/pain/bloating/early satiety. Discussed hormonal options vs surgical options vs observation.    Plan: outpatient fu US  We discussed potential causes of lower abdominal/pelvic pain: GYN, GI, , musculoskeletal, infectious process, adhesions, or other etiology  We discussed evaluation of lower abdominal/pelvic pain: including but not limited to observation, surgical evaluation/laparoscopy, imaging   We discussed treatment of lower abdominal/pelvic pain: including but not limited to: pain medication, hormonal management, surgical intervention, bowel regimen. Since pain can be a symptoms of an underlying abnormal process she is encouraged to contact my office with persistent symptoms for additional evaluation and treatment if needed. Disc s/sx of ovarian torsion/acute abdomen  Disc observation vs surgery vs observation in hospital for stability. Due to stable symptoms and non acute abdomen and COVID 19 pandemenic, plan pain medications with close follow up  Disc rba of narcotics and recommend bowel regimen prn and short course  We discussed safer NSAID dosing for heavy cycles. Pt was advised to take this dose with food and not to take for more than 5 days. Disc RBA including bleeding/gastric irritation. SANJEEV STEWART if NI to discuss other options. Past Medical History:   Diagnosis Date    Abnormal mammogram 10/06/2020    BI-RADS 0: Incomplete. Needs additional imaging evaluation. 2 groups of left breast calcifications    Abnormal mammogram of left breast 10/14/2020    BI-RADS Assessment Category 2: Benign finding. Milk of calcium calcifications are benign    Breast mass     left    Dysplasia of cervix, low grade (CORINE 1) 3/31/10    ecto    Genital HSV     Hx of mammogram 01/15/2019/ 11/12/21    Normal; normal/dense    Hx of mammogram 02/09/14    need addt'l views    Pap smear for cervical cancer screening 9/14/12; 11/16/17; 11/9/2021    negative, HPV negative; negative, HPV negative; negative HPVneg       Plan:     Discharge home  Outpatient US follow up  Pain precautions  Norco  NSAIDS    Signed By:  Harley Bowden MD     January 21, 2022      On this date, 1/21/2022,  I have spent 40 minutes reviewing previous notes, examining the patient, reviewing test results and face to face time with the patient discussing the diagnosis, plan of care and importance of compliance with the treatment plan and perfroming an exam.  We reviewed the planned hospital course as well as documented on the day of the hospitalization.      HCA imaging and BS SF imaging and MILAGROS imaging reviewed.

## 2022-01-24 ENCOUNTER — PATIENT MESSAGE (OUTPATIENT)
Dept: OBGYN CLINIC | Age: 47
End: 2022-01-24

## 2022-01-24 ENCOUNTER — TELEPHONE (OUTPATIENT)
Dept: OBGYN CLINIC | Age: 47
End: 2022-01-24

## 2022-01-24 NOTE — TELEPHONE ENCOUNTER
Patient calling stating she was seen in the ER on 1/21/21 and Dr Ansley Gardner did an exam on her. She advised her to contact us this morning to give her a letter stating she can return to work tomorrow. Generated the letter and sent to marianna.

## 2022-01-24 NOTE — LETTER
1/24/2022 9:00 AM    Ms. SEBASTIAN Buckner [phone]      To Whom it may concern;         Anjum Giron has been under my care since 1/21/22. Please excuse her from work from 1/21/22-1/24/22. She will be able to return to work as of Tuesday 1/25/22, at which time she will be capable of working with the following restrictions: no restrictions.         Sincerely,      Lion Marie MD

## 2022-01-24 NOTE — TELEPHONE ENCOUNTER
Patient was called back and advised of MD recommendations and was scheduled for follow up on 3/11/2022 at 8:30am for ultrasound ( arrive at 8:15am) and see MD at 9;00am    Patient verbalized understanding.

## 2022-01-25 NOTE — TELEPHONE ENCOUNTER
Patient calling at 10:35am TP patient    Patient calling back to say that she has been having the pelvic pain starting yesterday at 2:30pM that she currently rates at 9 on the pain scale of 1-10.     Patient is taking her pain medication with not much relief    Patient was schedule to be seen tomorrow at 3:00Pm for ultrasound and then MD at 4:00Pm    Patient is wondering about getting a note to not go to work tonight and was advised she can get a note at the appointment tomorrow    Patient is wondering how to proceed with the pain     Patient is taking naproxen and alternating with motrin and using heating pad with minimal relief and is out of the norco      Please advise

## 2022-01-26 ENCOUNTER — OFFICE VISIT (OUTPATIENT)
Dept: OBGYN CLINIC | Age: 47
End: 2022-01-26

## 2022-01-26 VITALS
WEIGHT: 152 LBS | DIASTOLIC BLOOD PRESSURE: 93 MMHG | SYSTOLIC BLOOD PRESSURE: 153 MMHG | HEIGHT: 66 IN | BODY MASS INDEX: 24.43 KG/M2

## 2022-01-26 DIAGNOSIS — R10.2 PELVIC PAIN IN FEMALE: ICD-10-CM

## 2022-01-26 DIAGNOSIS — N83.202 CYST OF LEFT OVARY: ICD-10-CM

## 2022-01-26 PROBLEM — D21.9 MYOMA: Status: ACTIVE | Noted: 2022-01-26

## 2022-01-26 PROCEDURE — 99213 OFFICE O/P EST LOW 20 MIN: CPT | Performed by: OBSTETRICS & GYNECOLOGY

## 2022-01-26 RX ORDER — IBUPROFEN 200 MG
TABLET ORAL
COMMUNITY
End: 2022-09-28

## 2022-01-26 RX ORDER — HYDROCODONE BITARTRATE AND ACETAMINOPHEN 5; 325 MG/1; MG/1
1 TABLET ORAL
Qty: 10 TABLET | Refills: 0 | Status: SHIPPED | OUTPATIENT
Start: 2022-01-26 | End: 2022-02-02

## 2022-01-26 RX ORDER — NAPROXEN 500 MG/1
TABLET ORAL
COMMUNITY
Start: 2022-01-21 | End: 2022-09-28

## 2022-01-26 NOTE — LETTER
NOTIFICATION RETURN TO WORK / SCHOOL    1/26/2022 4:35 PM    Ms. SEBASTIAN Buckner [phone]      To Whom It May Concern:    Linda Anthony is currently under the care of [address] [place] [address]. If there are questions or concerns please have the patient contact our office.         Sincerely,      Pratibha Bull MD

## 2022-01-26 NOTE — LETTER
1/26/2022 4:32 PM        Ms. SEBASTIAN Buckner [phone]          To Whom It May Concern,      Please excuse Víctor Lr from work on 01/23/2022 through 01/29/2022 due to gynocology complications. If there are any concerns, please have Ms. Jacob Dakin contact our office.          Sincerely,          Jailene Sherman MD

## 2022-01-26 NOTE — PROGRESS NOTES
[facility]  [email or web address]  [phone]    Grecia Bahena MD, [address]       OB/GYN Problem visit    Chief Complaint: No chief complaint on file. pelvic pain/ER fu    Last or next WWE is:  21    History of Present Illness: This is not a new problem being evaluated by this provider. ER follow up from 22 for LLQ pain. The patient is a 51 y.o.  female. She reports the symptoms are is unchanged. Aggravating factors include none. Alleviating factors include none. She does not have other concerns. LMP: Patient's last menstrual period was 2022. PFSH:  Past Medical History:   Diagnosis Date    Abnormal mammogram 10/06/2020    BI-RADS 0: Incomplete. Needs additional imaging evaluation. 2 groups of left breast calcifications    Abnormal mammogram of left breast 10/14/2020    BI-RADS Assessment Category 2: Benign finding.  Milk of calcium calcifications are benign    Breast mass     left    Dysplasia of cervix, low grade (CORINE 1) 3/31/10    ecto    Genital HSV     Hx of mammogram 01/15/2019/ 21    Normal; normal/dense    Hx of mammogram 14    need addt'l views    Pap smear for cervical cancer screening 12; 17; 2021    negative, HPV negative; negative, HPV negative; negative HPVneg     Past Surgical History:   Procedure Laterality Date    HX BREAST BIOPSY Left 2015    LEFT BREAST EXCISIONAL BIOPSY WITH ULTRASOUND performed by Kelle Singh MD at [address] HX COLPOSCOPY  3/31/10    CIN1    HX GYN      cyst removal    HX GYN  2002    fibroid removed: laparotomy     Family History   Problem Relation Age of Onset    No Known Problems Mother     No Known Problems Father     No Known Problems Brother     No Known Problems Brother     No Known Problems Daughter      Social History     Tobacco Use    Smoking status: Current Every Day Smoker     Packs/day: 0.25     Years: 6.00     Pack years: 1.50     Types: Cigarettes    Smokeless tobacco: Never Used    Tobacco comment: Never used vapor or e-cigs    Vaping Use    Vaping Use: Never used   Substance Use Topics    Alcohol use: Yes     Alcohol/week: 1.0 standard drink     Types: 1 Standard drinks or equivalent per week    Drug use: No     Allergies   Allergen Reactions    Diflucan [Fluconazole] Swelling     \"swollen face\"     Current Outpatient Medications   Medication Sig    naproxen (NAPROSYN) 500 mg tablet     ibuprofen (AdviL) 200 mg tablet Take  by mouth.  HYDROcodone-acetaminophen (NORCO) 5-325 mg per tablet Take 1 Tablet by mouth every four (4) hours as needed for Pain for up to 7 days. Max Daily Amount: 6 Tablets.  valACYclovir (VALTREX) 500 mg tablet Please take one tab by mouth 2 times a day for 3 days. (Patient not taking: Reported on 1/26/2022)     No current facility-administered medications for this visit. Review of Systems:  History obtained from the patient  Constitutional: negative for fevers, chills and weight loss  ENT ROS: negative for - hearing change, oral lesions or visual changes  Respiratory: negative for cough, wheezing or dyspnea on exertion  Cardiovascular: negative for chest pain, irregular heart beats, exertional chest pressure/discomfort  Gastrointestinal: negative for dysphagia, nausea and vomiting + constipation  Genito-Urinary ROS:  see HPI  Inteument/breast: negative for rash, breast lump and nipple discharge  Musculoskeletal:negative for stiff joints, neck pain and muscle weakness  Endocrine ROS: negative for - breast changes, galactorrhea or temperature intolerance  Hematological and Lymphatic ROS: negative for - blood clots, bruising or swollen lymph nodes    Physical Exam:  Visit Vitals  BP (!) 153/93   Ht 5' 6\" (1.676 m)   Wt 152 lb (68.9 kg)   BMI 24.53 kg/m²       GENERAL: alert, well appearing, and in no distress  HEAD: normocephalic, atraumatic.    ABDOMEN: soft, nontender, nondistended, no masses or organomegaly, no r/g  PELVIC: deferred by pt  NEURO: alert, oriented, normal speech    Assessment:  Encounter Diagnoses   Name Primary?  Cyst of left ovary     Pelvic pain in female    appears physiologic/hemorrhagic. Plan:  The patient is advised that she should contact the office if she does not note improvement or if symptoms recur  Recommend follow up with PCP for non-gynecologic complaints and chronic medical problems. She should contact our office with any questions or concerns  She could keep her routine annual exam appointment. Discussed typical course of benign/physiologic ovarian cyst vs s/sx ovarian carcinoma. Notify MD for bowel changes/pain/bloating/early satiety. Discussed hormonal options vs surgical options vs observation. Plan: FU and US ~ 4 wks or sooner prn  Pain management  Work note this week through Saturday, will try to go to work Sunday pm.  Disc s/sx of ovarian torsion and option of surgical management, plan observation with close follow up  rec bowel regimen  Narcotic precautions reviewed; rec lower dose/shortest period of time  We discussed potential causes of lower abdominal/pelvic pain: GYN, GI, , musculoskeletal, infectious process, adhesions, or other etiology  We discussed evaluation of lower abdominal/pelvic pain: including but not limited to observation, surgical evaluation/laparoscopy, imaging   We discussed treatment of lower abdominal/pelvic pain: including but not limited to: pain medication, hormonal management, surgical intervention, bowel regimen. Since pain can be a symptoms of an underlying abnormal process she is encouraged to contact my office with persistent symptoms for additional evaluation and treatment if needed. Rec BP fu with PCP      Physician review of ultrasound performed by technician    Today's ultrasound report and images were reviewed and discussed with the patient.   Please see images and imaging report entered by technician in PACS for more detail and progress note and diagnosis entered by MD.    Ale Lew MD      Orders Placed This Encounter    HYDROcodone-acetaminophen ([facility]) 5-325 mg per tablet       No results found for this visit on 01/26/22.

## 2022-03-11 ENCOUNTER — OFFICE VISIT (OUTPATIENT)
Dept: OBGYN CLINIC | Age: 47
End: 2022-03-11

## 2022-03-11 VITALS
WEIGHT: 146 LBS | SYSTOLIC BLOOD PRESSURE: 112 MMHG | HEIGHT: 66 IN | DIASTOLIC BLOOD PRESSURE: 77 MMHG | BODY MASS INDEX: 23.46 KG/M2 | HEART RATE: 76 BPM

## 2022-03-11 DIAGNOSIS — N83.202 CYST OF LEFT OVARY: Primary | ICD-10-CM

## 2022-03-11 DIAGNOSIS — D21.9 MYOMA: ICD-10-CM

## 2022-03-11 PROCEDURE — 99212 OFFICE O/P EST SF 10 MIN: CPT | Performed by: OBSTETRICS & GYNECOLOGY

## 2022-03-11 NOTE — PROGRESS NOTES
[address] [facility]  [email or web address]  [phone]    Jacob Dobbs MD, [address] [facility]       OB/GYN Problem visit    Chief Complaint:   Chief Complaint   Patient presents with    Follow-up    Ultrasound     Ultrasound today 3/11/22:  TRANSVAGINAL ULTRASOUND PERFORMED  UTERUS IS RETROVERTED, ENLARGED IN SIZE AND HETEROGENEOUS IN ECHOGENICITY. MULTIPLE FIBROIDS ARE SEEN. THE THREE LARGEST FIBROIDS ARE MEASURED. FIBROID 1, ANTERIOR  FIBROID 2, RIGHT LATERAL  FIBROID 3, RIGHT LATERAL PEDUNCULATED.  ENDOMETRIUM MEASURES 6-7MM IN THICKNESS. NO EVIDENCE OF MASSES OR ABNORMALITIES ARE SEEN. RIGHT ADNEXA APPEARS WITHIN NORMAL LIMITS. LEFT OVARY APPEARS WITHIN NORMAL LIMITS. A FOLLICULAR CYST IS SEEN. NO FREE FLUID SEEN IN THE CDS. Ultrasound on 2022:  TA AND TV SCANS PERFORMED FOR BEST VISUALIZATION. UTERUS IS ANTEVERTED, ENLARGED IN SIZE AND HETEROGENEOUS IN ECHOGENICITY. THERE APPEARS TO  BE MULTIPLE FIBROIDS SEEN, THE MOST PROMINENT ARE MEASURED. FIB 1- LT LATERAL FUNDAL  SUBSEROSAL. FIB 2- LT LATERAL POSTERIOR SUBSEROSAL. FIB 3- RT LATERAL SUBSEROSAL. ENDOMETRIUM MEASURES 9-10MM IN THICKNESS. LIMITED VISUALIZATION OF THE ENDOMETRIUM DUE TO  MULTIPLE FIBROID SHADOWS.  RIGHT OVARY IS NOT SEEN DUE TO BOWEL GAS. RIGHT ADNEXA APPEARS WNL. LEFT OVARY APPEARS TO HAVE A COMPLEX CYST LOW LEVEL ECHOES AND BLOODFLOW IN THE PERIPHERY  MEASURING 52 X 32 X 25MM, THIS MAY BE HEMORRAGIC. NO FREE FLUID SEEN IN THE CDS. Last or next WWE is: 2021    History of Present Illness: This is not a new problem being evaluated by this provider. Last seen 2022. The patient is a 51 y.o.  female who reports having no LLQ pain since last visit on 2022. She reports the symptoms are has significantly improved. Aggravating factors include none. Alleviating factors include none. She does have other concerns.     C/o constipation recently, even prune juice is not helping, pt has not tried taking colace or a stool softener yet. LMP: Patient's last menstrual period was 02/28/2022. PFSH:  Past Medical History:   Diagnosis Date    Abnormal mammogram 10/06/2020    BI-RADS 0: Incomplete. Needs additional imaging evaluation. 2 groups of left breast calcifications    Abnormal mammogram of left breast 10/14/2020    BI-RADS Assessment Category 2: Benign finding. Milk of calcium calcifications are benign    Breast mass     left    Dysplasia of cervix, low grade (CORINE 1) 3/31/10    ecto    Genital HSV     Hx of mammogram 01/15/2019/ 11/12/21    Normal; normal/dense    Hx of mammogram 02/09/14    need addt'l views    Pap smear for cervical cancer screening 9/14/12; 11/16/17; 11/9/2021    negative, HPV negative; negative, HPV negative; negative HPVneg     Past Surgical History:   Procedure Laterality Date    HX BREAST BIOPSY Left 4/2/2015    LEFT BREAST EXCISIONAL BIOPSY WITH ULTRASOUND performed by Wally Lnyne MD at [address] HX COLPOSCOPY  3/31/10    CIN1    HX GYN  1998    cyst removal    HX GYN  2002    fibroid removed: laparotomy     Family History   Problem Relation Age of Onset    No Known Problems Mother     No Known Problems Father     No Known Problems Brother     No Known Problems Brother     No Known Problems Daughter      Social History     Tobacco Use    Smoking status: Current Every Day Smoker     Packs/day: 0.25     Years: 6.00     Pack years: 1.50     Types: Cigarettes    Smokeless tobacco: Never Used    Tobacco comment: Never used vapor or e-cigs    Vaping Use    Vaping Use: Never used   Substance Use Topics    Alcohol use:  Yes     Alcohol/week: 1.0 standard drink     Types: 1 Standard drinks or equivalent per week    Drug use: No     Allergies   Allergen Reactions    Diflucan [Fluconazole] Swelling     \"swollen face\"     Current Outpatient Medications   Medication Sig    naproxen (NAPROSYN) 500 mg tablet  (Patient not taking: Reported on 3/11/2022)    ibuprofen (AdviL) 200 mg tablet Take  by mouth. (Patient not taking: Reported on 3/11/2022)    valACYclovir (VALTREX) 500 mg tablet Please take one tab by mouth 2 times a day for 3 days. (Patient not taking: Reported on 1/26/2022)     No current facility-administered medications for this visit. Review of Systems:  History obtained from the patient  Constitutional: negative for fevers, chills and weight loss  ENT ROS: negative for - hearing change, oral lesions or visual changes  Respiratory: negative for cough, wheezing or dyspnea on exertion  Cardiovascular: negative for chest pain, irregular heart beats, exertional chest pressure/discomfort  Gastrointestinal: negative for dysphagia, nausea and vomiting  Genito-Urinary ROS:  see HPI  Inteument/breast: negative for rash, breast lump and nipple discharge  Musculoskeletal:negative for stiff joints, neck pain and muscle weakness  Endocrine ROS: negative for - breast changes, galactorrhea or temperature intolerance  Hematological and Lymphatic ROS: negative for - blood clots, bruising or swollen lymph nodes    Physical Exam:  Visit Vitals  /77   Pulse 76   Ht 5' 6\" (1.676 m)   Wt 146 lb (66.2 kg)   BMI 23.57 kg/m²       GENERAL: alert, well appearing, and in no distress  HEAD: normocephalic, atraumatic. NEURO: alert, oriented, normal speech    Assessment:  Encounter Diagnoses   Name Primary?  Cyst of left ovary Yes    Myoma        Plan:  The patient is advised that she should contact the office if she does not note improvement or if symptoms recur  Recommend follow up with PCP for non-gynecologic complaints and chronic medical problems. She should contact our office with any questions or concerns  She could keep her routine annual exam appointment. Discussed typical course of benign/physiologic ovarian cyst vs s/sx ovarian carcinoma. Notify MD for bowel changes/pain/bloating/early satiety.   Discussed hormonal options vs surgical options vs observation. Plan: observation, appears to be resolving/physiologic  Disc options for myomas, plan observation  rec tobacco cessation and noted normal bp today (did not smoke this am)      Physician review of ultrasound performed by technician    Today's ultrasound report and images were reviewed and discussed with the patient. Please see images and imaging report entered by technician in PACS for more detail and progress note and diagnosis entered by MD.    Alfred Nelson MD      No orders of the defined types were placed in this encounter. No results found for this visit on 03/11/22.

## 2022-03-11 NOTE — PATIENT INSTRUCTIONS
Functional Ovarian Cyst: Care Instructions  Overview     A functional ovarian cyst is a fluid-filled sac that forms on the ovary. A sac normally forms during ovulation to hold a maturing egg. Usually the sac goes away after the egg is released. But if the egg is not released, or if the sac closes up after the egg is released, the sac can swell up with fluid. This forms a functional cyst.  These ovarian cysts are different than ovarian growths caused by other problems, such as cancer. Most functional ovarian cysts cause no symptoms and go away on their own. Some cause mild pain. Others can cause severe pain when they break or bleed. Follow-up care is a key part of your treatment and safety. Be sure to make and go to all appointments, and call your doctor if you are having problems. It's also a good idea to know your test results and keep a list of the medicines you take. How can you care for yourself at home? · Use heat, such as a hot water bottle, a heating pad set on low, or a warm bath, to relax tense muscles and relieve cramping. · Be safe with medicines. Take pain medicines exactly as directed. ? If the doctor gave you a prescription medicine for pain, take it as prescribed. ? If you are not taking a prescription pain medicine, ask your doctor if you can take an over-the-counter medicine. · Avoid constipation. Make sure you drink enough fluids and include fruits, vegetables, and fiber in your diet each day. Constipation does not cause ovarian cysts, but it may make you feel more uncomfortable. When should you call for help? Call your doctor now or seek immediate medical care if:    · You have severe vaginal bleeding.     · You have new or worse belly or pelvic pain. Watch closely for changes in your health, and be sure to contact your doctor if:    · You have unusual vaginal bleeding.     · You do not get better as expected. Where can you learn more?   Go to http://www.gray.com/  Enter M918 in the search box to learn more about \"Functional Ovarian Cyst: Care Instructions. \"  Current as of: November 22, 2021               Content Version: 13.2  © 0544-2697 [employer]wise, Incorporated. Care instructions adapted under license by [employer] (which disclaims liability or warranty for this information). If you have questions about a medical condition or this instruction, always ask your healthcare professional. [place] [address] any warranty or liability for your use of this information.

## 2022-03-18 PROBLEM — D21.9 MYOMA: Status: ACTIVE | Noted: 2022-01-26

## 2022-09-26 ENCOUNTER — TELEPHONE (OUTPATIENT)
Dept: OBGYN CLINIC | Age: 47
End: 2022-09-26

## 2022-09-26 NOTE — TELEPHONE ENCOUNTER
49 year old patient last seen in the office 11/09/2021      Patient calling to say that she has vaginal odor for a week . Patient was advise of need to wash vaginal area with plain warm water    Patient was placed on the schedule to be seen to on 9/28/2022 at 1:20Pm    Patient was advised to come att 1:00 PM    Patient verbalized understanding.

## 2022-09-28 ENCOUNTER — OFFICE VISIT (OUTPATIENT)
Dept: OBGYN CLINIC | Age: 47
End: 2022-09-28
Payer: COMMERCIAL

## 2022-09-28 VITALS — SYSTOLIC BLOOD PRESSURE: 135 MMHG | DIASTOLIC BLOOD PRESSURE: 85 MMHG | BODY MASS INDEX: 24.4 KG/M2 | WEIGHT: 151.2 LBS

## 2022-09-28 DIAGNOSIS — N89.8 VAGINAL DISCHARGE: ICD-10-CM

## 2022-09-28 DIAGNOSIS — N89.8 VAGINAL ODOR: Primary | ICD-10-CM

## 2022-09-28 DIAGNOSIS — D21.9 MYOMA: ICD-10-CM

## 2022-09-28 DIAGNOSIS — N92.4 EXCESSIVE BLEEDING IN PREMENOPAUSAL PERIOD: ICD-10-CM

## 2022-09-28 PROCEDURE — 99213 OFFICE O/P EST LOW 20 MIN: CPT | Performed by: OBSTETRICS & GYNECOLOGY

## 2022-09-28 RX ORDER — NICOTINE 21-14-7MG
KIT TRANSDERMAL
COMMUNITY

## 2022-09-28 RX ORDER — METRONIDAZOLE 500 MG/1
500 TABLET ORAL 2 TIMES DAILY
Qty: 14 TABLET | Refills: 0 | Status: SHIPPED | OUTPATIENT
Start: 2022-09-28 | End: 2022-10-05

## 2022-09-28 NOTE — PROGRESS NOTES
[address] [facility]  [email or web address]  [phone]    Kierra Olvera MD, [address] [facility]       OB/GYN Problem visit    Chief Complaint:   Chief Complaint   Patient presents with    Vaginitis       Last or next [address] is: due 2022    History of Present Illness: This is a new problem being evaluated by this provider. The patient is a 49 y.o.  female who reports having vaginal odor after cycle for 1 weeks. She reports the symptoms are is unchanged. Aggravating factors include none. Alleviating factors include none. Pelvis feels heavier, heavy x 1-2 days for cycle: ibuprofen. She does not have other concerns. LMP: Patient's last menstrual period was 2022. PFSH:  Past Medical History:   Diagnosis Date    Abnormal mammogram 10/06/2020    BI-RADS 0: Incomplete. Needs additional imaging evaluation. 2 groups of left breast calcifications    Abnormal mammogram of left breast 10/14/2020    BI-RADS Assessment Category 2: Benign finding.  Milk of calcium calcifications are benign    Breast mass     left    Dysplasia of cervix, low grade (CORINE 1) 3/31/10    ecto    Genital HSV     Hx of mammogram 01/15/2019/ 21    Normal; normal/dense    Hx of mammogram 14    need addt'l views    Pap smear for cervical cancer screening 12; 17; 2021    negative, HPV negative; negative, HPV negative; negative HPVneg     Past Surgical History:   Procedure Laterality Date    HX BREAST BIOPSY Left 2015    LEFT BREAST EXCISIONAL BIOPSY WITH ULTRASOUND performed by Lazarus Files, MD at [facility] 11    HX COLPOSCOPY  3/31/10    CIN1    HX GYN  1998    cyst removal    HX GYN      fibroid removed: laparotomy     Family History   Problem Relation Age of Onset    No Known Problems Mother     No Known Problems Father     No Known Problems Brother     No Known Problems Brother     No Known Problems Daughter      Social History     Tobacco Use    Smoking status: Every Day     Packs/day: 0.25     Years: 6.00     Pack years: 1.50     Types: Cigarettes    Smokeless tobacco: Never    Tobacco comments:     Never used vapor or e-cigs    Vaping Use    Vaping Use: Never used   Substance Use Topics    Alcohol use: Yes     Alcohol/week: 1.0 standard drink     Types: 1 Standard drinks or equivalent per week    Drug use: No     Allergies   Allergen Reactions    Diflucan [Fluconazole] Swelling     \"swollen face\"     Current Outpatient Medications   Medication Sig    Nicotine 21-14-7 mg/24 hr ptds by TransDERmal route. metroNIDAZOLE (FlagyL) 500 mg tablet Take 1 Tablet by mouth two (2) times a day for 7 days. naproxen (NAPROSYN) 500 mg tablet  (Patient not taking: No sig reported)    ibuprofen (AdviL) 200 mg tablet Take  by mouth. (Patient not taking: Reported on 3/11/2022)    valACYclovir (VALTREX) 500 mg tablet Please take one tab by mouth 2 times a day for 3 days. (Patient not taking: No sig reported)     No current facility-administered medications for this visit.        Review of Systems:  History obtained from the patient  Constitutional: negative for fevers, chills and weight loss  ENT ROS: negative for - hearing change, oral lesions or visual changes  Respiratory: negative for cough, wheezing or dyspnea on exertion  Cardiovascular: negative for chest pain, irregular heart beats, exertional chest pressure/discomfort  Gastrointestinal: negative for dysphagia, nausea and vomiting  Genito-Urinary ROS:  see HPI  Inteument/breast: negative for rash, breast lump and nipple discharge  Musculoskeletal:negative for stiff joints, neck pain and muscle weakness  Endocrine ROS: negative for - breast changes, galactorrhea or temperature intolerance  Hematological and Lymphatic ROS: negative for - blood clots, bruising or swollen lymph nodes    Physical Exam:  Visit Vitals  /85   Wt 151 lb 3.2 oz (68.6 kg)   BMI 24.40 kg/m²       GENERAL: alert, well appearing, and in no distress  HEAD: normocephalic, atraumatic. PULM: clear to auscultation, no wheezes, rales or rhonchi, symmetric air entry   COR: normal rate and regular rhythm, S1 and S2 normal   ABDOMEN: soft, nontender, nondistended, no masses or organomegaly   EGBUS: no lesions, no inflammation, no masses  VULVA: normal appearing vulva with no masses, tenderness or lesions  VAGINA: normal appearing vagina with normal color, no lesions, white and thin discharge  CERVIX: normal appearing cervix without discharge or lesions, non tender  UTERUS: uterus is enlarged 8-10 size, nodular shape, consistency and nontender   ADNEXA: normal adnexa in size, nontender and no masses  NEURO: alert, oriented, normal speech    Assessment:  Encounter Diagnoses   Name Primary? Vaginal odor Yes    Vaginal discharge     Myoma     Excessive bleeding in premenopausal period        Plan:  The patient is advised that she should contact the office if she does not note improvement or if symptoms recur  Recommend follow up with PCP for non-gynecologic complaints and chronic medical problems. She should contact our office with any questions or concerns  She could keep her routine annual exam appointment. We discussed potential causes of vaginal discharge/irritation. We discussed good vulvar hygiene. Recommended avoid vaginal irritants. Discussed use of mild soaps/detergents. Follow up if NI. Patient will be notified about swab results and prescription sent, if indicated. Disc management options for heavy menses/fibroids, pt prefers observation  Fu WWE this winter. Orders Placed This Encounter    NUSWAB VAGINITIS PLUS ([facility])    metroNIDAZOLE (FlagyL) 500 mg tablet       No results found for this visit on 09/28/22.

## 2022-10-01 LAB
A VAGINAE DNA VAG QL NAA+PROBE: ABNORMAL SCORE
BVAB2 DNA VAG QL NAA+PROBE: ABNORMAL SCORE
C ALBICANS DNA VAG QL NAA+PROBE: NEGATIVE
C GLABRATA DNA VAG QL NAA+PROBE: NEGATIVE
C TRACH DNA VAG QL NAA+PROBE: NEGATIVE
MEGA1 DNA VAG QL NAA+PROBE: ABNORMAL SCORE
N GONORRHOEA DNA VAG QL NAA+PROBE: NEGATIVE
T VAGINALIS DNA VAG QL NAA+PROBE: NEGATIVE

## 2022-10-02 NOTE — PROGRESS NOTES
Abnormal,Consistent with BV   Notify pt if [facility] staff message not read or not active.   Rx:   flagyl 500mg bid   x 7 days  Sent at visit  May cause nausea, take with food  Avoid etoh during treatment and 1 day following  Notify MD if NI after 1 week    (If patient prefers vaginal tx't  0.75 %t metronidazole vaginal gel   5 grams qhs per vagina  x5 days)

## 2022-10-03 ENCOUNTER — TELEPHONE (OUTPATIENT)
Dept: OBGYN CLINIC | Age: 47
End: 2022-10-03

## 2022-10-03 NOTE — TELEPHONE ENCOUNTER
Writer spoke with patient after identification was verified. Results reviewed and patient notified of MD recommendations. Patient verbalized understanding.

## 2022-12-08 ENCOUNTER — OFFICE VISIT (OUTPATIENT)
Dept: OBGYN CLINIC | Age: 47
End: 2022-12-08

## 2022-12-08 VITALS — SYSTOLIC BLOOD PRESSURE: 162 MMHG | DIASTOLIC BLOOD PRESSURE: 95 MMHG | WEIGHT: 154.6 LBS | BODY MASS INDEX: 24.95 KG/M2

## 2022-12-08 DIAGNOSIS — Z11.3 VENEREAL DISEASE SCREENING: ICD-10-CM

## 2022-12-08 DIAGNOSIS — Z20.2 EXPOSURE TO SEXUALLY TRANSMITTED DISEASE (STD): ICD-10-CM

## 2022-12-08 DIAGNOSIS — R03.0 ELEVATED BLOOD PRESSURE READING: Primary | ICD-10-CM

## 2022-12-08 NOTE — PROGRESS NOTES
Mora Funez is a 49 y.o. female returns for an annual exam     Chief Complaint   Patient presents with    Well Woman       Patient's last menstrual period was 12/08/2022. Her periods are normal in flow and usually regular with a 26-32 day interval with 3-7 day duration. She does not have dysmenorrhea. Problems: no significant problems  Birth Control: none. Last Pap: normal obtained 1 year(s) ago. She does not have a history of CORINE 2, 3 or cervical cancer. Last Mammogram: had her mammogram today in our office. It was see report     1. Have you been to the ER, urgent care clinic, or hospitalized since your last visit? No    2. Have you seen or consulted any other health care providers outside of the [address] [place] [address] since your last visit? No    Examination chaperoned by Tyson Gandhi LPN.

## 2022-12-08 NOTE — PROGRESS NOTES
[facility]  [email or web address]  [phone]    Ny Vidales MD, [address]     Annual Gynecologic Exam:  Chief Complaint   Patient presents with    Well Woman       Jasbir Fry is a ,  49 y.o. female   Patient's last menstrual period was 2022. She presents for her annual checkup. She is having no significant problems. Per Rooming Note:     Patient's last menstrual period was 2022. Her periods are normal in flow and usually regular with a 26-32 day interval with 3-7 day duration. She does not have dysmenorrhea. Problems: no significant problems  Birth Control: none. Last Pap: normal obtained 1 year(s) ago. She does not have a history of CORINE 2, 3 or cervical cancer. Last Mammogram: had her mammogram today in our office. It was see report   Sexual history and Contraception:  Social History     Substance and Sexual Activity   Sexual Activity Yes    Partners: Male    Birth control/protection: None       Past Medical History:   Diagnosis Date    Abnormal mammogram 10/06/2020    BI-RADS 0: Incomplete. Needs additional imaging evaluation. 2 groups of left breast calcifications    Abnormal mammogram of left breast 10/14/2020    BI-RADS Assessment Category 2: Benign finding. Milk of calcium calcifications are benign    Breast mass     left    Dysplasia of cervix, low grade (CORINE 1) 3/31/10    ecto    Genital HSV     Hx of mammogram 01/15/2019/ 21    Normal; normal/dense    Hx of mammogram 14    need addt'l views    Pap smear for cervical cancer screening 12; 17; 2021    negative, HPV negative; negative, HPV negative; negative HPVneg     Current Outpatient Medications   Medication Sig    miscellaneous medical supply (Blood Pressure Cuff) misc 1 Device by Does Not Apply route every seven (7) days. Nicotine 21-14-7 mg/24 hr ptds by TransDERmal route.  (Patient not taking: Reported on 2022)     No current facility-administered medications for this visit. OB History    Para Term  AB Living   2 1 1 0 1 1   SAB IAB Ectopic Molar Multiple Live Births   0 1 0   0 1      # Outcome Date GA Lbr Mike/2nd Weight Sex Delivery Anes PTL Lv   2 IAB            1 Term               Obstetric Comments   Menarche:  14. LMP: 2/23/15. # of Children:  1. Age at Delivery of First Child:  24.   Hysterectomy/oophorectomy:  NO/NO. Breast Bx:  no.  Hx of Breast Feeding:  no. BCP:  no. Hormone therapy:  no.      Past Surgical History:   Procedure Laterality Date    HX BREAST BIOPSY Left 2015    LEFT BREAST EXCISIONAL BIOPSY WITH ULTRASOUND performed by Anthony Schmidt MD at [place] [address]    HX COLPOSCOPY  3/31/10    CIN1    HX GYN      cyst removal    HX GYN      fibroid removed: laparotomy     Family History   Problem Relation Age of Onset    No Known Problems Mother     No Known Problems Father     No Known Problems Brother     No Known Problems Brother     No Known Problems Daughter      Social History     Socioeconomic History    Marital status:      Spouse name: Not on file    Number of children: Not on file    Years of education: Not on file    Highest education level: Not on file   Occupational History    Not on file   Tobacco Use    Smoking status: Every Day     Packs/day: 0.25     Years: 6.00     Pack years: 1.50     Types: Cigarettes    Smokeless tobacco: Never    Tobacco comments:     Never used vapor or e-cigs    Vaping Use    Vaping Use: Never used   Substance and Sexual Activity    Alcohol use:  Yes     Alcohol/week: 1.0 standard drink     Types: 1 Standard drinks or equivalent per week    Drug use: No    Sexual activity: Yes     Partners: Male     Birth control/protection: None   Other Topics Concern    Not on file   Social History Narrative    Not on file     Social Determinants of Health     Financial Resource Strain: Not on file   Food Insecurity: Not on file   Transportation Needs: Not on file Physical Activity: Not on file   Stress: Not on file   Social Connections: Not on file   Intimate Partner Violence: Not on file   Housing Stability: Not on file     Tobacco History:  reports that she has been smoking cigarettes. She has a 1.50 pack-year smoking history. She has never used smokeless tobacco.  Alcohol Abuse:  reports current alcohol use of about 1.0 standard drink per week. Drug Abuse:  reports no history of drug use. Allergies   Allergen Reactions    Diflucan [Fluconazole] Swelling     \"swollen face\"       Patient Active Problem List   Diagnosis Code    Depression F32. A    Major depressive disorder, recurrent episode, severe, without mention of psychotic behavior F33.2    Borderline personality disorder ([facility] [address] F60.3    Atypical hyperplasia of left breast N60.92    Myoma D21.9       Review of Systems - History obtained from the patient and patient filled out questionnaire   Constitutional/general, HEENT, CV, Resp, GI, MSK, Neuro, Psych, Heme/lymph, Skin, Breast ROS: no significant complaints except as noted on HPI    Physical Exam  Visit Vitals  BP (!) 162/95   Wt 154 lb 9.6 oz (70.1 kg)   LMP 12/08/2022   BMI 24.95 kg/m²       Constitutional  Appearance: well-nourished, well developed, alert, in no acute distress    HENT  Head and Face: appears normal    Neck  Inspection/Palpation: normal appearance, no masses or tenderness  Lymph Nodes: no lymphadenopathy present  Thyroid: gland size normal, nontender, no nodules or masses present on palpation    Chest  Respiratory Effort: breathing unlabored  Auscultation: normal breath sounds    Cardiovascular  Heart:   Auscultation: regular rate and rhythm without murmur    Breasts  Inspection of Breasts: breasts symmetrical, no skin changes, no discharge present, nipple appearance normal, no skin retraction present  Palpation of Breasts and Axillae: no masses present on palpation, no breast tenderness  Axillary Lymph Nodes: no lymphadenopathy present    Gastrointestinal  Abdominal Examination: abdomen non-tender to palpation, normal bowel sounds, no masses present  Liver and spleen: no hepatomegaly present, spleen not palpable  Hernias: no hernias identified    Genitourinary  External Genitalia: normal appearance for age, no discharge present, no tenderness present, no inflammatory lesions present, no masses present  Vagina: normal vaginal vault without central or paravaginal defects, blood tinged discharge present, no inflammatory lesions present, no masses present  Bladder: non-tender to palpation  Urethra: appears normal  Cervix: normal   Uterus: normal size, shape and consistency  Adnexa: no adnexal tenderness present, no adnexal masses present  Perineum: perineum within normal limits, no evidence of trauma, no rashes or skin lesions present  Anus: anus within normal limits, no hemorrhoids present  Inguinal Lymph Nodes: no lymphadenopathy present    Skin  General Inspection: no rash, no lesions identified    Neurologic/Psychiatric  Mental Status:  Orientation: grossly oriented to person, place and time  Mood and Affect: mood normal, affect appropriate    Assessment:  49 y.o.  for well woman exam  Her current medical status is satisfactory with no evidence of significant gynecologic issues. Encounter Diagnoses   Name Primary? Elevated blood pressure reading Yes    Exposure to sexually transmitted disease (STD)     Venereal disease screening        Plan:  The patient was counseled about diet, exercise, healthy lifestyle  I recommend annual well woman exams  We discussed current pap smear and HR HPV testing guidelines. I recommended follow up one year for routine annual gynecologic exam or sooner prn  Handouts were given to the patient  I recommended follow up with a primary care physician for chronic medical problems and evaluation of non-gynecologic concerns and to please contact our office with any GYN questions or concerns.   I recommended testing per CDC guidelines and at patient request.   We discussed elevated blood pressure reading. I recommend BP log/home BP cuff and PCP follow up for additional management/surveillance. Refill valtrex prn  Bp cuff, rx given    Folllow up:  [x] return for annual well woman exam in one year or sooner if she is having problems  [] follow up and ultrasound  [] 6 months  [] 3 months  [] 6 weeks   [] 1 month    Orders Placed This Encounter    CT/NG/T.VAGINALIS AMPLIFICATION    miscellaneous medical supply (Blood Pressure Cuff) misc       Results for orders placed or performed during the hospital encounter of 12/08/22   [facility] MAMMO BI SCREENING INCL CAD    Narrative    STUDY: Bilateral digital screening mammogram    INDICATION:  Screening. COMPARISON: Prior studies dating back to 2015    BREAST COMPOSITION:  The breasts are extremely dense, which lowers the  sensitivity of mammography. FINDINGS: Bilateral digital screening mammography was performed and is  interpreted in conjunction with a computer assisted detection (CAD)   system. Milk  of calcium in the left breast is noted. No suspicious masses or   calcifications  are identified. There has been no significant change. Impression    BI-RADS 2: Benign. No mammographic evidence of malignancy. RECOMMENDATIONS:  Next screening mammogram is recommended in one year. Given extremely dense  breast tissue, consider 3-D mammography/tomosynthesis for future screening  needs. The patient will be notified of these results.

## 2022-12-11 LAB
C TRACH RRNA SPEC QL NAA+PROBE: NEGATIVE
N GONORRHOEA RRNA SPEC QL NAA+PROBE: NEGATIVE
T VAGINALIS RRNA SPEC QL NAA+PROBE: NEGATIVE

## 2023-06-13 RX ORDER — METRONIDAZOLE 500 MG/1
TABLET ORAL
Qty: 14 TABLET | OUTPATIENT
Start: 2023-06-13

## 2023-06-22 ENCOUNTER — OFFICE VISIT (OUTPATIENT)
Age: 48
End: 2023-06-22
Payer: COMMERCIAL

## 2023-06-22 VITALS
SYSTOLIC BLOOD PRESSURE: 134 MMHG | HEIGHT: 66 IN | WEIGHT: 144.8 LBS | BODY MASS INDEX: 23.27 KG/M2 | DIASTOLIC BLOOD PRESSURE: 85 MMHG | HEART RATE: 88 BPM

## 2023-06-22 DIAGNOSIS — N89.8 VAGINAL DISCHARGE: Primary | ICD-10-CM

## 2023-06-22 DIAGNOSIS — Z11.3 VENEREAL DISEASE SCREENING: ICD-10-CM

## 2023-06-22 DIAGNOSIS — Z20.2 EXPOSURE TO SEXUALLY TRANSMITTED DISEASE (STD): ICD-10-CM

## 2023-06-22 DIAGNOSIS — N89.8 VAGINAL ODOR: ICD-10-CM

## 2023-06-22 PROCEDURE — 99213 OFFICE O/P EST LOW 20 MIN: CPT | Performed by: OBSTETRICS & GYNECOLOGY

## 2023-06-22 RX ORDER — HYDROQUINONE 40 MG/G
CREAM TOPICAL
COMMUNITY
Start: 2023-06-09

## 2023-06-22 RX ORDER — METRONIDAZOLE 500 MG/1
500 TABLET ORAL 2 TIMES DAILY
Qty: 14 TABLET | Refills: 0 | Status: SHIPPED | OUTPATIENT
Start: 2023-06-22 | End: 2023-06-29

## 2023-06-22 NOTE — PROGRESS NOTES
Rooming note, gyn problem visit:    Chief Complaint   Patient presents with    Vaginal Odor     Eve gandhi a 51 y.o. female presents for a problem visit. Patient's last menstrual period was 05/29/2023. Birth Control: none. Last Pap: approximate date 11/2021 and was normal  Last or next WWE is: 12/8/22    The patient is reporting having: vaginal odor before cycles start the past few months, pt suspects BV, pt denies new partners, reports mild irritation, denies concerns for a yeast infection. This is a new problem. She has experienced this problem before. She reports the symptoms are is unchanged. Aggravating factors include none. And alleviating factors include none. She does not have other concerns.
metroNIDAZOLE (FLAGYL) 500 MG tablet     Sig: Take 1 tablet by mouth 2 times daily for 7 days Avoid alcohol with this medication. Dispense:  14 tablet     Refill:  0       No follow-ups on file.

## 2023-06-26 LAB
A VAGINAE DNA VAG QL NAA+PROBE: ABNORMAL SCORE
BVAB2 DNA VAG QL NAA+PROBE: ABNORMAL SCORE
C ALBICANS DNA VAG QL NAA+PROBE: NEGATIVE
C GLABRATA DNA VAG QL NAA+PROBE: NEGATIVE
MEGA1 DNA VAG QL NAA+PROBE: ABNORMAL SCORE
T VAGINALIS DNA VAG QL NAA+PROBE: NEGATIVE

## 2024-04-19 ENCOUNTER — TRANSCRIBE ORDERS (OUTPATIENT)
Facility: HOSPITAL | Age: 49
End: 2024-04-19

## 2024-04-19 DIAGNOSIS — Z12.31 SCREENING MAMMOGRAM FOR HIGH-RISK PATIENT: Primary | ICD-10-CM

## 2024-05-10 ENCOUNTER — HOSPITAL ENCOUNTER (OUTPATIENT)
Facility: HOSPITAL | Age: 49
Discharge: HOME | End: 2024-05-10
Attending: OBSTETRICS & GYNECOLOGY
Payer: COMMERCIAL

## 2024-05-10 ENCOUNTER — OFFICE VISIT (OUTPATIENT)
Age: 49
End: 2024-05-10

## 2024-05-10 VITALS
HEART RATE: 74 BPM | WEIGHT: 149.4 LBS | DIASTOLIC BLOOD PRESSURE: 100 MMHG | SYSTOLIC BLOOD PRESSURE: 162 MMHG | BODY MASS INDEX: 24.11 KG/M2

## 2024-05-10 VITALS — WEIGHT: 144 LBS | BODY MASS INDEX: 23.14 KG/M2 | HEIGHT: 66 IN

## 2024-05-10 DIAGNOSIS — Z87.42 H/O ABNORMAL CERVICAL PAPANICOLAOU SMEAR: ICD-10-CM

## 2024-05-10 DIAGNOSIS — N92.4 MENORRHAGIA, PREMENOPAUSAL: ICD-10-CM

## 2024-05-10 DIAGNOSIS — D21.9 MYOMA: ICD-10-CM

## 2024-05-10 DIAGNOSIS — Z12.31 SCREENING MAMMOGRAM FOR HIGH-RISK PATIENT: ICD-10-CM

## 2024-05-10 DIAGNOSIS — Z01.419 ENCOUNTER FOR GYNECOLOGICAL EXAMINATION: Primary | ICD-10-CM

## 2024-05-10 DIAGNOSIS — Z12.4 CERVICAL CANCER SCREENING: ICD-10-CM

## 2024-05-10 DIAGNOSIS — Z11.51 ENCOUNTER FOR SCREENING FOR HUMAN PAPILLOMAVIRUS (HPV): ICD-10-CM

## 2024-05-10 DIAGNOSIS — Z76.89 ENCOUNTER FOR MENSTRUAL REGULATION: ICD-10-CM

## 2024-05-10 PROCEDURE — 77063 BREAST TOMOSYNTHESIS BI: CPT

## 2024-05-10 RX ORDER — DROSPIRENONE 4 MG/1
1 TABLET, FILM COATED ORAL DAILY
Qty: 90 TABLET | Refills: 0 | Status: SHIPPED | OUTPATIENT
Start: 2024-05-10

## 2024-05-10 NOTE — PROGRESS NOTES
[facility]  [email or web address]  [phone]    Syeda Tena MD, FACOG        Annual Gynecologic Exam:  Chief Complaint   Patient presents with    Annual Exam       Betsy Day is a ,  48 y.o. female   Patient's last menstrual period was 2024.    The patient presents for her annual gynecologic checkup.     The patient is having problems - wants to change the timing of her cycle.  Heavy cycles. Changes tampon frequently.   Co hot flashes/night sweats before/after cycle at night.    Per Rooming Note:  Patient's last menstrual period was 2024.  Her periods are light then heavy for one day then tapers to spotting in flow and usually regular with a 26-32 day interval with 3-7 day duration.  She has dysmenorrhea, ibuprofen if needed.   Problems: pt is going to [place] next month for her 49th birthday & doesn't want to be on her cycle. Pt has a hx of OCP & depo per pt. Pt started her period on 24 & is still on it now.   Pt reports night sweats during her cycles.   Birth Control: none.  Last Pap: normal obtained 2021 WNL  She does not have a history of ROMEO 2, 3 or cervical cancer.   Last Mammogram: had her mammogram today in the hospital.   Last Bone Density: never obtained.   Last colonoscopy: in the past year WNL per pt    Sexual history and Contraception:    Social History     Substance and Sexual Activity   Sexual Activity Yes    Partners: Male    Birth control/protection: None      Past Medical History:   Diagnosis Date    Abnormal mammogram 10/06/2020    BI-RADS 0: Incomplete. Needs additional imaging evaluation. 2 groups of left breast calcifications    Abnormal mammogram of left breast 10/14/2020    BI-RADS Assessment Category 2: Benign finding. Milk of calcium calcifications are benign    Abnormal Pap smear of cervix     Breast mass     left    Dysplasia of cervix, low grade (ROMEO 1) 2010    ecto    Genital HSV     H/O mammogram 2022

## 2024-05-10 NOTE — PROGRESS NOTES
Betsy Day is a 48 y.o. female returns for an annual exam     Chief Complaint   Patient presents with    Annual Exam       Patient's last menstrual period was 05/07/2024.  Her periods are light then heavy for one day then tapers to spotting in flow and usually regular with a 26-32 day interval with 3-7 day duration.  She has dysmenorrhea, ibuprofen if needed.   Problems: pt is going to [place] next month for her 49th birthday & doesn't want to be on her cycle. Pt has a hx of OCP & depo per pt. Pt started her period on 5/7/24 & is still on it now.   Pt reports night sweats during her cycles.   Birth Control: none.  Last Pap: normal obtained 11/09/2021 WNL  She does not have a history of ROMEO 2, 3 or cervical cancer.   Last Mammogram: had her mammogram today in the hospital.   Last Bone Density: never obtained.   Last colonoscopy: in the past year WNL per pt        Examination chaperoned by Kanika Hamilton MA.

## 2024-05-12 NOTE — RESULT ENCOUNTER NOTE
MMG low risk/dense.    Please update PMH/HM include date of imaging (mm/dd/yy) add \"dense\" or \"very dense\" to comments, see report  If pt desires, see  message, order bilateral screening breast US Order: [id number] for US Whole Breat Screening BI complete.  Note: imaging done at [facility] and could cost out of pocket ~$500.  Patient may decline, or schedule a consult to discuss breast cancer risks in more detail.

## 2024-05-15 RX ORDER — DROSPIRENONE 4 MG/1
1 TABLET, FILM COATED ORAL DAILY
Qty: 90 TABLET | Refills: 0 | Status: SHIPPED | OUTPATIENT
Start: 2024-05-15

## 2024-05-15 NOTE — TELEPHONE ENCOUNTER
48 year old patient last seen in the office on 5/10/2024    Prescription resent to patient preferred pharmacy as per MD verbal order

## 2024-05-17 LAB
CYTOLOGIST CVX/VAG CYTO: NORMAL
CYTOLOGY CVX/VAG DOC CYTO: NORMAL
CYTOLOGY CVX/VAG DOC THIN PREP: NORMAL
DX ICD CODE: NORMAL
HPV GENOTYPE REFLEX: NORMAL
HPV I/H RISK 4 DNA CVX QL PROBE+SIG AMP: NEGATIVE
Lab: NORMAL
OTHER STN SPEC: NORMAL
STAT OF ADQ CVX/VAG CYTO-IMP: NORMAL

## 2024-10-10 ENCOUNTER — PATIENT MESSAGE (OUTPATIENT)
Age: 49
End: 2024-10-10

## 2024-10-10 RX ORDER — VALACYCLOVIR HYDROCHLORIDE 500 MG/1
500 TABLET, FILM COATED ORAL 2 TIMES DAILY
Qty: 30 TABLET | Refills: 1 | Status: SHIPPED | OUTPATIENT
Start: 2024-10-10 | End: 2024-10-13

## 2025-05-15 NOTE — PROGRESS NOTES
Betys Day is a 49 y.o. female returns for an annual exam     Chief Complaint   Patient presents with    Annual Exam       Patient's last menstrual period was 05/07/2025.  Her periods are spotting, moderate in flow and usually regular with a 26-32 day interval with 3-7 day duration.  She does not have dysmenorrhea.  Problems: no problems  Birth Control: none.  Last Pap: normal obtained 1 year(s) ago.  She does not have a history of ROMEO 2, 3 or cervical cancer.   Last Mammogram: had her mammogram today in our office.    Last colonoscopy: normal obtained 1 year(s) ago.          Examination chaperoned by NATHAN LIZAMA RN.

## 2025-05-16 ENCOUNTER — OFFICE VISIT (OUTPATIENT)
Age: 50
End: 2025-05-16
Payer: COMMERCIAL

## 2025-05-16 VITALS
HEIGHT: 66 IN | HEART RATE: 90 BPM | DIASTOLIC BLOOD PRESSURE: 88 MMHG | RESPIRATION RATE: 18 BRPM | WEIGHT: 154 LBS | SYSTOLIC BLOOD PRESSURE: 145 MMHG | BODY MASS INDEX: 24.75 KG/M2

## 2025-05-16 DIAGNOSIS — Z11.3 SCREEN FOR STD (SEXUALLY TRANSMITTED DISEASE): Primary | ICD-10-CM

## 2025-05-16 DIAGNOSIS — Z01.419 ENCOUNTER FOR WELL WOMAN EXAM WITH ROUTINE GYNECOLOGICAL EXAM: ICD-10-CM

## 2025-05-16 PROCEDURE — 99396 PREV VISIT EST AGE 40-64: CPT | Performed by: OBSTETRICS & GYNECOLOGY

## 2025-05-16 SDOH — ECONOMIC STABILITY: FOOD INSECURITY: WITHIN THE PAST 12 MONTHS, YOU WORRIED THAT YOUR FOOD WOULD RUN OUT BEFORE YOU GOT MONEY TO BUY MORE.: NEVER TRUE

## 2025-05-16 SDOH — ECONOMIC STABILITY: FOOD INSECURITY: WITHIN THE PAST 12 MONTHS, THE FOOD YOU BOUGHT JUST DIDN'T LAST AND YOU DIDN'T HAVE MONEY TO GET MORE.: NEVER TRUE

## 2025-05-16 NOTE — PROGRESS NOTES
Annual exam  Betsy Day is a ,  49 y.o. female   Patient's last menstrual period was 2025.    She presents for her annual checkup.     History of Present Illness  The patient is a 49-year-old female who presents for her annual exam.    She has rescheduled her mammogram appointment to Monday. She reports no current breast concerns. She has a family history of breast cancer on her maternal side, with the relative being diagnosed at approximately 70 years of age.    She has a known history of fibroids, although she is uncertain about their current size. She continues to menstruate, with the first and second days being heavy, followed by a tapering off period, but then experiences prolonged spotting. Her menstrual cycle typically lasts between 3 to 5 days, but she reported spotting as recently as yesterday. She occasionally experiences pain during intercourse, which she attributes to positional factors. She has previously undergone fibroid surgery in  and has used birth control pills and Depo-Provera, but currently does not use any of these methods due to associated discomfort. She can not recall the date of her last ultrasound. She was previously advised to consider a hysterectomy but opted against it.    She reports no presence of lumps, bumps, sores, or unusual discharge. She also reports no concerns related to sexually transmitted diseases but maintains regular screenings. She currently uses condoms for birth control.    Sexual history:    She  reports being sexually active and has had partner(s) who are male. She reports using the following method of birth control/protection: None.    Per Rooming Note:  Patient's last menstrual period was 2025.  Her periods are spotting, moderate in flow and usually regular with a 26-32 day interval with 3-7 day duration.  She does not have dysmenorrhea.  Problems: no problems  Birth Control: none.  Last Pap: normal obtained 1 year(s) ago.  She does not

## 2025-05-19 ENCOUNTER — OFFICE VISIT (OUTPATIENT)
Age: 50
End: 2025-05-19
Payer: COMMERCIAL

## 2025-05-19 ENCOUNTER — RESULTS FOLLOW-UP (OUTPATIENT)
Age: 50
End: 2025-05-19

## 2025-05-19 VITALS
RESPIRATION RATE: 16 BRPM | BODY MASS INDEX: 25.82 KG/M2 | WEIGHT: 160 LBS | HEART RATE: 88 BPM | DIASTOLIC BLOOD PRESSURE: 97 MMHG | SYSTOLIC BLOOD PRESSURE: 166 MMHG

## 2025-05-19 DIAGNOSIS — D21.9 FIBROIDS: ICD-10-CM

## 2025-05-19 DIAGNOSIS — D21.9 FIBROIDS: Primary | ICD-10-CM

## 2025-05-19 DIAGNOSIS — N83.202 CYST OF LEFT OVARY: ICD-10-CM

## 2025-05-19 DIAGNOSIS — R92.8 ABNORMAL SCREENING MAMMOGRAM: Primary | ICD-10-CM

## 2025-05-19 PROCEDURE — 99213 OFFICE O/P EST LOW 20 MIN: CPT | Performed by: OBSTETRICS & GYNECOLOGY

## 2025-05-19 ASSESSMENT — PATIENT HEALTH QUESTIONNAIRE - PHQ9: DEPRESSION UNABLE TO ASSESS: PT REFUSES

## 2025-05-19 NOTE — PROGRESS NOTES
GYN Problem Visit Note    Chief Complaint   Annual Exam   Patient's last menstrual period was 05/07/2025..      JARRETT aDy is a 49 y.o. female who complains of   Chief Complaint   Patient presents with    Annual Exam       She reports feeling of heaviness and pressure in abdomen.  Periods are not heavy.  She previously had myomectomy approx 20 years ago.     Today's ultrasound report and images were reviewed and discussed with the patient.  Please see images and imaging report entered by technician in PACS for more detail   I discussed with the patient the limitations of ultrasound and that imaging can not rule out all abnormalities.    US findings:  Transvaginal ultrasound performed  Uterus is anteverted, enlarged in size and heterogeneous in echogenicity.  There appears to be multiple fibroids.  The most prominent measured are:  Fibroid 1-anterior left subserosal measuring 8.1 x 6.1 x 8.0 cm.  Fibroid 2-left pedunculated measuring 5.9 x 4.5 x 5.3 cm.  Fibroid 3-right lateral subserosal measuring 4.0 x 3.9 x 5.0 cm.  The endometrium is difficult to visualize.  The endometrium appears heterogeneous and measures 8 to 9 mm in thickness.  Right adnexa appears within normal limits.  The left ovary appears to be a cyst that measures 2.5 x 1.2 by 1.8 cm.  Free fluid is seen in the cul-de-sac.    Per Nursing Note:  Patient's last menstrual period was 05/07/2025.  Her periods are light in flow and usually regular with a 26-32 day interval with 3-7 day duration.  She does not have dysmenorrhea.  Problems: no problems  Birth Control: none.  Last Pap: normal obtained 1 year(s) ago.  She does not have a history of ROMEO 2, 3 or cervical cancer.   Last Mammogram: had her mammogram today in our office.  It was abnormal.     Past Surgical History:   Procedure Laterality Date    BREAST BIOPSY Left 4/2/2015    LEFT BREAST EXCISIONAL BIOPSY WITH ULTRASOUND performed by Ramos DONOHUE MD at [facility] AMBULATORY OR    COLPOSCOPY

## 2025-05-19 NOTE — PROGRESS NOTES
Betsy Day is a 49 y.o. female returns for an annual exam     Chief Complaint   Patient presents with    Annual Exam       Patient's last menstrual period was 05/07/2025.  Her periods are light in flow and usually regular with a 26-32 day interval with 3-7 day duration.  She does not have dysmenorrhea.  Problems: no problems  Birth Control: none.  Last Pap: normal obtained 1 year(s) ago.  She does not have a history of ROMEO 2, 3 or cervical cancer.   Last Mammogram: had her mammogram today in our office.  It was abnormal.   .        Examination chaperoned by NATHAN LIZAMA RN.

## 2025-05-20 ENCOUNTER — RESULTS FOLLOW-UP (OUTPATIENT)
Age: 50
End: 2025-05-20

## 2025-05-20 ENCOUNTER — CLINICAL DOCUMENTATION (OUTPATIENT)
Age: 50
End: 2025-05-20

## 2025-05-20 NOTE — PROGRESS NOTES
Rec'd a referral for Dr Vera from Leah Perez MD for abnormal mammogram. Called and left a message requesting a return call to schedule.

## 2025-06-04 ENCOUNTER — TELEPHONE (OUTPATIENT)
Age: 50
End: 2025-06-04

## 2025-06-04 NOTE — TELEPHONE ENCOUNTER
PT name and  verified   , last OV 25  PT calling to make sure her appointment for a mammogram tomorrow is at the correct location, nurse spoke with provider who assure pt is going to the correct location. Nurse relayed information to pt who verbalized understanding

## 2025-06-05 ENCOUNTER — HOSPITAL ENCOUNTER (OUTPATIENT)
Facility: HOSPITAL | Age: 50
Discharge: HOME | End: 2025-06-08
Payer: COMMERCIAL

## 2025-06-05 VITALS — WEIGHT: 160 LBS | HEIGHT: 66 IN | BODY MASS INDEX: 25.71 KG/M2

## 2025-06-05 DIAGNOSIS — R92.8 ABNORMAL MAMMOGRAM OF RIGHT BREAST: ICD-10-CM

## 2025-06-05 PROCEDURE — 77065 DX MAMMO INCL CAD UNI: CPT

## 2025-06-06 ENCOUNTER — RESULTS FOLLOW-UP (OUTPATIENT)
Age: 50
End: 2025-06-06

## 2025-06-06 DIAGNOSIS — R92.8 ABNORMALITY OF BOTH BREASTS ON SCREENING MAMMOGRAM: Primary | ICD-10-CM

## 2025-06-13 ENCOUNTER — TELEPHONE (OUTPATIENT)
Age: 50
End: 2025-06-13

## 2025-06-13 NOTE — TELEPHONE ENCOUNTER
Two patient identifiers used      50 year old patient last seen in the office on 5/16/2025 for ae.    Patient add additional breast imaging done and set up appointment with radiologist for biopsy.    Patient calling about needing referral. Patient was advised that she has a referral and was provided the phone number to contact [facility] to set up appointment.    Patient verbalized understanding.

## 2025-06-17 DIAGNOSIS — R92.8 ABNORMAL FINDINGS ON DIAGNOSTIC IMAGING OF BREAST: Primary | ICD-10-CM

## 2025-06-24 DIAGNOSIS — R92.8 ABNORMAL MAMMOGRAM: Primary | ICD-10-CM

## 2025-07-30 ENCOUNTER — HOSPITAL ENCOUNTER (OUTPATIENT)
Facility: HOSPITAL | Age: 50
Discharge: HOME | End: 2025-08-02
Attending: OBSTETRICS & GYNECOLOGY
Payer: COMMERCIAL

## 2025-07-30 ENCOUNTER — HOSPITAL ENCOUNTER (OUTPATIENT)
Facility: HOSPITAL | Age: 50
Discharge: HOME | End: 2025-08-02
Attending: SURGERY
Payer: COMMERCIAL

## 2025-07-30 VITALS — WEIGHT: 160 LBS | BODY MASS INDEX: 25.82 KG/M2

## 2025-07-30 DIAGNOSIS — R92.1 CALCIFICATION OF RIGHT BREAST ON MAMMOGRAPHY: ICD-10-CM

## 2025-07-30 DIAGNOSIS — R92.8 ABNORMAL MAMMOGRAM: ICD-10-CM

## 2025-07-30 PROCEDURE — A4648 IMPLANTABLE TISSUE MARKER: HCPCS

## 2025-07-30 PROCEDURE — 77065 DX MAMMO INCL CAD UNI: CPT

## 2025-07-30 PROCEDURE — 88305 TISSUE EXAM BY PATHOLOGIST: CPT

## 2025-07-30 NOTE — PROGRESS NOTES
10:30 a.m.   Patient received for a Right breast stereotactic biopsy/breast clip.  Procedure explained to patient as well as risks associated with procedure.  Discharge instructions briefly discussed with patient.  Patient expects to be contacted by phone with pathology results.    11:05 a.m.   Time-out performed with all participants present.    12:10 p.m.   Patient tolerated procedure well with minimal bleeding.  Pressure held for 5 minutes; a dressing was applied to biopsy site (steri-strip, gauze, and tegaderm).  Patient was then sent for a post biopsy mammogram.   Discharge instructions discussed with patient and a copy given to patient.  Dressing dry and intact on discharge; an ice pack was applied over dressing to biopsy site.  Patient expects to be contacted by phone with pathology results.

## 2025-08-01 ENCOUNTER — HOSPITAL ENCOUNTER (OUTPATIENT)
Facility: HOSPITAL | Age: 50
Discharge: HOME | End: 2025-08-01
Attending: SURGERY
Payer: COMMERCIAL

## 2025-08-01 DIAGNOSIS — R92.8 ABNORMAL MAMMOGRAM OF RIGHT BREAST: ICD-10-CM

## 2025-08-01 PROCEDURE — 76098 X-RAY EXAM SURGICAL SPECIMEN: CPT

## 2025-08-05 ENCOUNTER — TELEPHONE (OUTPATIENT)
Age: 50
End: 2025-08-05

## 2025-08-05 DIAGNOSIS — R92.8 ABNORMAL SCREENING MAMMOGRAM: Primary | ICD-10-CM
